# Patient Record
Sex: FEMALE | Race: WHITE | NOT HISPANIC OR LATINO | Employment: FULL TIME | URBAN - METROPOLITAN AREA
[De-identification: names, ages, dates, MRNs, and addresses within clinical notes are randomized per-mention and may not be internally consistent; named-entity substitution may affect disease eponyms.]

---

## 2017-03-08 ENCOUNTER — HOSPITAL ENCOUNTER (OUTPATIENT)
Dept: LAB | Facility: MEDICAL CENTER | Age: 37
End: 2017-03-08
Payer: COMMERCIAL

## 2017-03-08 LAB
25(OH)D3 SERPL-MCNC: 29 NG/ML (ref 30–100)
EST. AVERAGE GLUCOSE BLD GHB EST-MCNC: 103 MG/DL
GLUCOSE SERPL-MCNC: 91 MG/DL (ref 65–99)
HBA1C MFR BLD: 5.2 % (ref 0–5.6)
T3FREE SERPL-MCNC: 4.17 PG/ML (ref 2.4–4.2)
T4 FREE SERPL-MCNC: 0.87 NG/DL (ref 0.53–1.43)
TSH SERPL DL<=0.005 MIU/L-ACNC: 0.99 UIU/ML (ref 0.3–3.7)
URATE SERPL-MCNC: 4 MG/DL (ref 1.9–8.2)

## 2017-03-08 PROCEDURE — 84481 FREE ASSAY (FT-3): CPT

## 2017-03-08 PROCEDURE — 84482 T3 REVERSE: CPT

## 2017-03-08 PROCEDURE — 84550 ASSAY OF BLOOD/URIC ACID: CPT

## 2017-03-08 PROCEDURE — 82306 VITAMIN D 25 HYDROXY: CPT

## 2017-03-08 PROCEDURE — 83525 ASSAY OF INSULIN: CPT

## 2017-03-08 PROCEDURE — 82947 ASSAY GLUCOSE BLOOD QUANT: CPT

## 2017-03-08 PROCEDURE — 84443 ASSAY THYROID STIM HORMONE: CPT

## 2017-03-08 PROCEDURE — 84439 ASSAY OF FREE THYROXINE: CPT

## 2017-03-08 PROCEDURE — 83036 HEMOGLOBIN GLYCOSYLATED A1C: CPT

## 2017-03-08 PROCEDURE — 36415 COLL VENOUS BLD VENIPUNCTURE: CPT

## 2017-03-10 LAB — INSULIN P FAST SERPL-ACNC: 10 UIU/ML (ref 3–19)

## 2017-03-11 LAB — T3REVERSE SERPL-MCNC: 10 NG/DL (ref 9–27)

## 2017-12-03 DIAGNOSIS — G43.101 MIGRAINE WITH AURA AND WITH STATUS MIGRAINOSUS, NOT INTRACTABLE: ICD-10-CM

## 2017-12-04 ENCOUNTER — TELEPHONE (OUTPATIENT)
Dept: NEUROLOGY | Facility: MEDICAL CENTER | Age: 37
End: 2017-12-04

## 2017-12-05 RX ORDER — RIZATRIPTAN BENZOATE 10 MG/1
TABLET, ORALLY DISINTEGRATING ORAL
Qty: 9 TAB | Refills: 2 | Status: SHIPPED | OUTPATIENT
Start: 2017-12-05 | End: 2019-02-06 | Stop reason: SDUPTHER

## 2019-02-06 DIAGNOSIS — G43.101 MIGRAINE WITH AURA AND WITH STATUS MIGRAINOSUS, NOT INTRACTABLE: ICD-10-CM

## 2019-02-06 RX ORDER — RIZATRIPTAN BENZOATE 10 MG/1
TABLET, ORALLY DISINTEGRATING ORAL
Qty: 9 TAB | Refills: 0 | Status: SHIPPED
Start: 2019-02-06 | End: 2020-05-08

## 2020-05-08 ENCOUNTER — OFFICE VISIT (OUTPATIENT)
Dept: URGENT CARE | Facility: CLINIC | Age: 40
End: 2020-05-08
Payer: COMMERCIAL

## 2020-05-08 VITALS
DIASTOLIC BLOOD PRESSURE: 96 MMHG | SYSTOLIC BLOOD PRESSURE: 128 MMHG | RESPIRATION RATE: 16 BRPM | HEART RATE: 96 BPM | TEMPERATURE: 98.7 F | WEIGHT: 165 LBS | OXYGEN SATURATION: 98 % | BODY MASS INDEX: 28.17 KG/M2 | HEIGHT: 64 IN

## 2020-05-08 DIAGNOSIS — I88.9 LYMPHADENITIS: ICD-10-CM

## 2020-05-08 PROCEDURE — 99214 OFFICE O/P EST MOD 30 MIN: CPT | Performed by: FAMILY MEDICINE

## 2020-05-08 RX ORDER — RIZATRIPTAN BENZOATE 10 MG/1
TABLET ORAL
COMMUNITY
Start: 2020-04-23 | End: 2020-11-18

## 2020-05-08 RX ORDER — CEPHALEXIN 500 MG/1
500 CAPSULE ORAL 3 TIMES DAILY
Qty: 21 CAP | Refills: 0 | Status: SHIPPED | OUTPATIENT
Start: 2020-05-08 | End: 2020-05-15

## 2020-05-08 NOTE — PROGRESS NOTES
"Subjective:      Kerry Champagne is a 40 y.o. female who presents with Bump (under chin x3 wks )      - This is a pleasant and non toxic appearing 40 y.o. female with c/o gland swelling x 3 wks Rt side neck. No trauma or NVFC. Nothing makes it better or worse.             ALLERGIES:  Lactose and Percocet [oxycodone-acetaminophen]     PMH:  Past Medical History:   Diagnosis Date   • Family planning, IUD (intrauterine device) in place 4/27/2015   • Migraine    • Migraine with aura and without status migrainosus, not intractable         PSH:  Past Surgical History:   Procedure Laterality Date   • HAMMERTOE CORRECTION Left 5/22/2015    Procedure: HAMMERTOE CORRECTION: WITH PERCUTANEOUS FIXATION 4TH;  Surgeon: John Lorenzo D.P.M.;  Location: SURGERY SAME DAY [facility];  Service:    • DILATION AND EVACUATION  11/2/2010    Performed by ANAHI RICHEY at SURGERY [facility]   • HAMMERTOE CORRECTION  2002   • OTHER ORTHOPEDIC SURGERY  1999    foot surgery       MEDS:    Current Outpatient Medications:   •  cephALEXin (KEFLEX) 500 MG Cap, Take 1 Cap by mouth 3 times a day for 7 days., Disp: 21 Cap, Rfl: 0  •  rizatriptan (MAXALT) 10 MG tablet, , Disp: , Rfl:     ** I have documented what I find to be significant in regards to past medical, social, family and surgical history  in my HPI or under PMH/PSH/FH review section, otherwise it is contributory **           HPI    Review of Systems   HENT:        Gland swelling     All other systems reviewed and are negative.         Objective:     /96   Pulse 96   Temp 37.1 °C (98.7 °F) (Temporal)   Resp 16   Ht 1.626 m (5' 4\")   Wt 74.8 kg (165 lb)   SpO2 98%   BMI 28.32 kg/m²      Physical Exam  Vitals signs and nursing note reviewed.   Constitutional:       General: She is not in acute distress.     Appearance: She is well-developed. She is not diaphoretic.   HENT:      Head: Normocephalic and atraumatic.      Mouth/Throat:      Mouth: Mucous membranes " are moist.      Pharynx: Oropharynx is clear. No oropharyngeal exudate or posterior oropharyngeal erythema.   Eyes:      Conjunctiva/sclera: Conjunctivae normal.   Neck:      Musculoskeletal: No neck rigidity or muscular tenderness.   Cardiovascular:      Heart sounds: Normal heart sounds. No murmur.   Pulmonary:      Effort: Pulmonary effort is normal. No respiratory distress.      Breath sounds: Normal breath sounds.   Lymphadenopathy:      Cervical: Cervical adenopathy ( olive sized soft rubbery mobile Rt upper anterior cervical node) present.   Skin:     General: Skin is warm and dry.   Neurological:      Mental Status: She is alert.      Motor: No abnormal muscle tone.   Psychiatric:         Judgment: Judgment normal.                 Assessment/Plan:           1. Lymphadenitis  cephALEXin (KEFLEX) 500 MG Cap       - rest  - E.R. precautions discussed     Dx & d/c instructions discussed w/ patient and/or family members.     Follow up with PCP (or UC if PCP is unavailable) in 2-3 days to make sure improving and no further additional treatment needed, ER if not improving or feeling/getting worse.    Any realistic and/or common medication side effects that may have been given today(i.e. Rash, GI upset/constipation, sedation, elevation of BP or blood sugars) reviewed.     Patient left in stable condition      reviewed if narcotics given

## 2020-06-16 ENCOUNTER — HOSPITAL ENCOUNTER (OUTPATIENT)
Dept: RADIOLOGY | Facility: MEDICAL CENTER | Age: 40
End: 2020-06-16
Attending: OTOLARYNGOLOGY
Payer: COMMERCIAL

## 2020-06-16 DIAGNOSIS — R22.1 NECK MASS: ICD-10-CM

## 2020-06-16 PROCEDURE — 76536 US EXAM OF HEAD AND NECK: CPT

## 2020-06-23 ENCOUNTER — HOSPITAL ENCOUNTER (OUTPATIENT)
Dept: RADIOLOGY | Facility: MEDICAL CENTER | Age: 40
End: 2020-06-23
Attending: OTOLARYNGOLOGY
Payer: COMMERCIAL

## 2020-06-23 DIAGNOSIS — R22.1 NECK MASS: ICD-10-CM

## 2020-06-23 PROCEDURE — 10005 FNA BX W/US GDN 1ST LES: CPT

## 2020-06-23 PROCEDURE — 88112 CYTOPATH CELL ENHANCE TECH: CPT

## 2020-06-23 PROCEDURE — 88305 TISSUE EXAM BY PATHOLOGIST: CPT

## 2020-06-23 NOTE — PROGRESS NOTES
MU RN note.     US guided right Neck mass nodule fine needle aspiration done by Dr. Aaron; NON-SEDATION  (no H&P required as this is a NON SEDATION procedure) Right  anterior aspect of neck access site; 1 jar of cytolyt and x1 RPMI obtained and sent to pathology lab; pt tolerated the procedure well;  all questions and concerns answered prior to being d/c; patient provided with appropriate education for procedure; pt d/c home.

## 2020-06-24 LAB — CYTOLOGY REG CYTOL: NORMAL

## 2020-10-28 ENCOUNTER — HOSPITAL ENCOUNTER (OUTPATIENT)
Dept: RADIOLOGY | Facility: MEDICAL CENTER | Age: 40
End: 2020-10-28
Attending: OTOLARYNGOLOGY
Payer: COMMERCIAL

## 2020-10-28 DIAGNOSIS — R22.1 NECK MASS: ICD-10-CM

## 2020-10-28 PROCEDURE — 700117 HCHG RX CONTRAST REV CODE 255: Performed by: INTERNAL MEDICINE

## 2020-10-28 PROCEDURE — 70491 CT SOFT TISSUE NECK W/DYE: CPT

## 2020-10-28 RX ADMIN — IOHEXOL 80 ML: 350 INJECTION, SOLUTION INTRAVENOUS at 15:00

## 2020-11-18 ENCOUNTER — OFFICE VISIT (OUTPATIENT)
Dept: MEDICAL GROUP | Facility: MEDICAL CENTER | Age: 40
End: 2020-11-18
Payer: COMMERCIAL

## 2020-11-18 VITALS
RESPIRATION RATE: 16 BRPM | HEIGHT: 64 IN | TEMPERATURE: 97.6 F | OXYGEN SATURATION: 96 % | SYSTOLIC BLOOD PRESSURE: 131 MMHG | HEART RATE: 86 BPM | BODY MASS INDEX: 31.62 KG/M2 | WEIGHT: 185.19 LBS | DIASTOLIC BLOOD PRESSURE: 74 MMHG

## 2020-11-18 DIAGNOSIS — Z23 NEED FOR VACCINATION: ICD-10-CM

## 2020-11-18 DIAGNOSIS — Z83.49 FAMILY HISTORY OF THYROID DISEASE: ICD-10-CM

## 2020-11-18 DIAGNOSIS — E78.00 ELEVATED LDL CHOLESTEROL LEVEL: ICD-10-CM

## 2020-11-18 DIAGNOSIS — G43.109 MIGRAINE WITH AURA AND WITHOUT STATUS MIGRAINOSUS, NOT INTRACTABLE: ICD-10-CM

## 2020-11-18 DIAGNOSIS — E55.9 VITAMIN D INSUFFICIENCY: ICD-10-CM

## 2020-11-18 DIAGNOSIS — Z00.00 ANNUAL PHYSICAL EXAM: ICD-10-CM

## 2020-11-18 DIAGNOSIS — Z13.1 SCREENING FOR DIABETES MELLITUS: ICD-10-CM

## 2020-11-18 PROCEDURE — 99395 PREV VISIT EST AGE 18-39: CPT | Mod: 25 | Performed by: FAMILY MEDICINE

## 2020-11-18 PROCEDURE — 90714 TD VACC NO PRESV 7 YRS+ IM: CPT | Performed by: FAMILY MEDICINE

## 2020-11-18 PROCEDURE — 90471 IMMUNIZATION ADMIN: CPT | Performed by: FAMILY MEDICINE

## 2020-11-18 PROCEDURE — 90472 IMMUNIZATION ADMIN EACH ADD: CPT | Performed by: FAMILY MEDICINE

## 2020-11-18 PROCEDURE — 90686 IIV4 VACC NO PRSV 0.5 ML IM: CPT | Performed by: FAMILY MEDICINE

## 2020-11-18 RX ORDER — RIZATRIPTAN BENZOATE 10 MG/1
10 TABLET, ORALLY DISINTEGRATING ORAL
Qty: 30 TAB | Refills: 0 | Status: SHIPPED | OUTPATIENT
Start: 2020-11-18 | End: 2023-04-18

## 2020-11-18 ASSESSMENT — ENCOUNTER SYMPTOMS
PALPITATIONS: 0
ABDOMINAL PAIN: 0
FOCAL WEAKNESS: 0
NAUSEA: 0
FEVER: 0
DIZZINESS: 0
NERVOUS/ANXIOUS: 0
HEMOPTYSIS: 0
VOMITING: 0
SPUTUM PRODUCTION: 0
SHORTNESS OF BREATH: 0
DIARRHEA: 0
EYE PAIN: 0
WEIGHT LOSS: 0
DEPRESSION: 0
SENSORY CHANGE: 0
CHILLS: 0
EYE REDNESS: 0
COUGH: 0
CONSTIPATION: 0
HEADACHES: 0
MYALGIAS: 0
SINUS PAIN: 0
WHEEZING: 0
EYE DISCHARGE: 0

## 2020-11-18 ASSESSMENT — LIFESTYLE VARIABLES: SUBSTANCE_ABUSE: 0

## 2020-11-18 ASSESSMENT — PATIENT HEALTH QUESTIONNAIRE - PHQ9: CLINICAL INTERPRETATION OF PHQ2 SCORE: 0

## 2020-11-18 NOTE — PROGRESS NOTES
cc: well exam    Subjective:     Kerry Champagne is a 40 y.o. female presents for a routine preventive health exam.      Patient has history of migraine headaches, reports that are controlled with rizatriptan as needed.  She reports that she gets migraine headaches once in 3 to 4 months.  She reports that she gets nausea, photophobia, phonophobia and aura.  Quested refill for medication.    Ob-Gyn/ History:    /Para:    Last Pap Smear:  Oct, 2020,no history of abnormal pap smears.  Gyn Surgery:  No  Current Contraceptive Method:   had vasectomy. currently sexually active.  Last menstrual period:  2020 Periods regular    Health Maintenance  Advanced directive:n/a   Osteoporosis Screen/ DEXA: n/a   PT/vit D for falls prevention: n/a   Cholesterol Screening: Ordered lipid panel.  Diabetes Screening: Ordered A1C  AAA Screening: n/a  Aspirin Use: n/a  Diet: Reports tries to eat healthy diet  Exercise: Is physically active  Substance Abuse: Denies  Safe in relationship.  Seat belts, bike helmet, gun safety discussed.  Sun protection used.    Cancer screening  Colorectal Cancer Screening:  Denies family history of colon cancer  Lung Cancer Screening: Denies smoking  Cervical Cancer Screening: Reports her Pap smear 2020 was normal.  Breast Cancer Screening: Denies family history of breast cancer.    Infectious disease screening/Immunizations  --STI Screening: Declined  --Practices safe sex.  --Immunizations:        Review of Systems   Constitutional: Negative for chills, fever, malaise/fatigue and weight loss.   HENT: Negative for ear discharge, ear pain, hearing loss and sinus pain.    Eyes: Negative for pain, discharge and redness.   Respiratory: Negative for cough, hemoptysis, sputum production, shortness of breath and wheezing.    Cardiovascular: Negative for chest pain, palpitations and leg swelling.   Gastrointestinal: Negative for abdominal pain, constipation, diarrhea,  nausea and vomiting.   Genitourinary: Negative for dysuria, frequency and urgency.   Musculoskeletal: Negative for joint pain and myalgias.   Skin: Negative for itching and rash.   Neurological: Negative for dizziness, sensory change, focal weakness and headaches.   Endo/Heme/Allergies: Negative for environmental allergies.   Psychiatric/Behavioral: Negative for depression, substance abuse and suicidal ideas. The patient is not nervous/anxious.              Current Outpatient Medications:   •  rizatriptan (MAXALT-MLT) 10 MG disintegrating tablet, Take 1 Tab by mouth 1 time daily as needed for Migraine., Disp: 30 Tab, Rfl: 0  •  Multiple Vitamins-Minerals (MULTIVITAMIN ADULT PO), Take  by mouth., Disp: , Rfl:     Allergies   Allergen Reactions   • Lactose      Dairy   • Percocet [Oxycodone-Acetaminophen] Vomiting and Nausea     'dizzy'       Past Medical History:   Diagnosis Date   • Family planning, IUD (intrauterine device) in place 4/27/2015   • Migraine    • Migraine with aura and without status migrainosus, not intractable      Past Surgical History:   Procedure Laterality Date   • HAMMERTOE CORRECTION Left 5/22/2015    Procedure: HAMMERTOE CORRECTION: WITH PERCUTANEOUS FIXATION 4TH;  Surgeon: ELLA DempseyPGABRIELLA;  Location: SURGERY SAME DAY [facility];  Service:    • DILATION AND EVACUATION  11/2/2010    Performed by ANAHI RICHEY at SURGERY [facility]   • HAMMERTOE CORRECTION  2002   • OTHER ORTHOPEDIC SURGERY  1999    foot surgery     Family History   Problem Relation Age of Onset   • Hypertension Mother    • Hypertension Maternal Grandmother    • Stroke Maternal Grandfather      Social History     Tobacco Use   • Smoking status: Never Smoker   • Smokeless tobacco: Never Used   Substance Use Topics   • Alcohol use: Yes     Alcohol/week: 0.0 oz     Comment: Rare; 1/week   • Drug use: No        Objective:     Vitals: /74 (BP Location: Left arm, Patient Position: Sitting, BP Cuff Size: Adult)    "Pulse 86   Temp 36.4 °C (97.6 °F)   Resp 16   Ht 1.626 m (5' 4\")   Wt 84 kg (185 lb 3 oz)   SpO2 96%   BMI 31.79 kg/m²     Physical Exam   Constitutional: She is well-developed, well-nourished, and in no distress. No distress.   HENT:   Head: Normocephalic and atraumatic.   Right Ear: External ear normal.   Left Ear: External ear normal.   Eyes: Conjunctivae and EOM are normal. Right eye exhibits no discharge. Left eye exhibits no discharge.   Neck: Neck supple. No thyromegaly present.   Cardiovascular: Normal rate, regular rhythm, normal heart sounds and intact distal pulses.   No murmur heard.  Pulmonary/Chest: Effort normal and breath sounds normal. No respiratory distress. She has no wheezes. She has no rales.   Abdominal: Soft. Bowel sounds are normal. She exhibits no distension. There is no abdominal tenderness. There is no guarding.   Musculoskeletal: Normal range of motion.         General: No deformity or edema.   Neurological: She is alert. She exhibits normal muscle tone. Gait normal.   Skin: Skin is warm. No rash noted. She is not diaphoretic.   Psychiatric: Mood, affect and judgment normal.           Assessment/Plan:     Diagnoses and all orders for this visit:    Annual physical exam  · Advised healthy diet and aerobic exercise.    Migraine with aura and without status migrainosus, not intractable  · Controlled, refilled medication.    -     rizatriptan (MAXALT-MLT) 10 MG disintegrating tablet; Take 1 Tab by mouth 1 time daily as needed for Migraine.    Elevated LDL cholesterol level  · Uncontrolled LDL level, advised healthy diet and aerobic exercise.  · Recheck labs.    -     Comp Metabolic Panel; Future  -     HEMOGLOBIN A1C; Future  -     Lipid Profile; Future  -     TSH WITH REFLEX TO FT4; Future  -     Patient identified as having weight management issue.  Appropriate orders and counseling given.    Screening for diabetes mellitus  -     Comp Metabolic Panel; Future  -     HEMOGLOBIN A1C; " Future    Family history of thyroid disease  -     Comp Metabolic Panel; Future  -     TSH WITH REFLEX TO FT4; Future    Vitamin D insufficiency  -     VITAMIN D,25 HYDROXY; Future    Need for vaccination  -     Influenza Vaccine Quad Injection (PF)  -     TD Preservative Free =>6yo IM        Preventive visit in 1 year, sooner as needed for any concerns.

## 2020-11-18 NOTE — LETTER
[facility] hospital  Darryl Padilla M.D.  [address]  [place] [address]  Fax: [phone]   Authorization for Release/Disclosure of   Protected Health Information   Name: KERRY CHAMPAGNE : 1980 SSN: [id number]   Address: [facility] [address] [facility] [address]  [place] [address] Phone:    [phone] (home)    I authorize the entity listed below to release/disclose the PHI below to:   [facility] hospital/Darryl Padilla M.D. and Darryl Padilla M.D.   Provider or Entity Name   Address   City, State, Zip   Phone:      Fax:     Reason for request: continuity of care   Information to be released:    [  ] LAST COLONOSCOPY,  including any PATH REPORT and follow-up  [  ] LAST FIT/COLOGUARD RESULT [  ] LAST DEXA  [  ] LAST MAMMOGRAM  [  ] LAST PAP  [  ] LAST LABS [  ] RETINA EXAM REPORT  [  ] IMMUNIZATION RECORDS  [  ] Release all info      [  ] Check here and initial the line next to each item to release ALL health information INCLUDING  _____ Care and treatment for drug and / or alcohol abuse  _____ HIV testing, infection status, or AIDS  _____ Genetic Testing    DATES OF SERVICE OR TIME PERIOD TO BE DISCLOSED: _____________  I understand and acknowledge that:  * This Authorization may be revoked at any time by you in writing, except if your health information has already been used or disclosed.  * Your health information that will be used or disclosed as a result of you signing this authorization could be re-disclosed by the recipient. If this occurs, your re-disclosed health information may no longer be protected by State or Federal laws.  * You may refuse to sign this Authorization. Your refusal will not affect your ability to obtain treatment.  * This Authorization becomes effective upon signing and will  on (date) __________.      If no date is indicated, this Authorization will  one (1) year from the signature date.    Name: Kerry Champagne    Signature:   Date:          11/18/2020       PLEASE FAX REQUESTED RECORDS BACK TO: [phone]

## 2020-11-30 ENCOUNTER — HOSPITAL ENCOUNTER (OUTPATIENT)
Dept: LAB | Facility: MEDICAL CENTER | Age: 40
End: 2020-11-30
Attending: FAMILY MEDICINE
Payer: COMMERCIAL

## 2020-11-30 ENCOUNTER — HOSPITAL ENCOUNTER (OUTPATIENT)
Dept: LAB | Facility: MEDICAL CENTER | Age: 40
End: 2020-11-30
Attending: OBSTETRICS & GYNECOLOGY
Payer: COMMERCIAL

## 2020-11-30 DIAGNOSIS — Z13.1 SCREENING FOR DIABETES MELLITUS: ICD-10-CM

## 2020-11-30 DIAGNOSIS — E78.00 ELEVATED LDL CHOLESTEROL LEVEL: ICD-10-CM

## 2020-11-30 DIAGNOSIS — Z83.49 FAMILY HISTORY OF THYROID DISEASE: ICD-10-CM

## 2020-11-30 DIAGNOSIS — E55.9 VITAMIN D INSUFFICIENCY: ICD-10-CM

## 2020-11-30 LAB
25(OH)D3 SERPL-MCNC: 39 NG/ML (ref 30–100)
25(OH)D3 SERPL-MCNC: 40 NG/ML (ref 30–100)
ALBUMIN SERPL BCP-MCNC: 4.2 G/DL (ref 3.2–4.9)
ALBUMIN/GLOB SERPL: 1.6 G/DL
ALP SERPL-CCNC: 74 U/L (ref 30–99)
ALT SERPL-CCNC: 12 U/L (ref 2–50)
ANION GAP SERPL CALC-SCNC: 8 MMOL/L (ref 7–16)
AST SERPL-CCNC: 13 U/L (ref 12–45)
BILIRUB SERPL-MCNC: 0.4 MG/DL (ref 0.1–1.5)
BUN SERPL-MCNC: 9 MG/DL (ref 8–22)
CALCIUM SERPL-MCNC: 8.9 MG/DL (ref 8.5–10.5)
CHLORIDE SERPL-SCNC: 102 MMOL/L (ref 96–112)
CHOLEST SERPL-MCNC: 240 MG/DL (ref 100–199)
CO2 SERPL-SCNC: 25 MMOL/L (ref 20–33)
CREAT SERPL-MCNC: 0.83 MG/DL (ref 0.5–1.4)
FASTING STATUS PATIENT QL REPORTED: NORMAL
GLOBULIN SER CALC-MCNC: 2.6 G/DL (ref 1.9–3.5)
GLUCOSE SERPL-MCNC: 94 MG/DL (ref 65–99)
HDLC SERPL-MCNC: 57 MG/DL
LDLC SERPL CALC-MCNC: 159 MG/DL
POTASSIUM SERPL-SCNC: 4.2 MMOL/L (ref 3.6–5.5)
PROT SERPL-MCNC: 6.8 G/DL (ref 6–8.2)
SODIUM SERPL-SCNC: 135 MMOL/L (ref 135–145)
TRIGL SERPL-MCNC: 118 MG/DL (ref 0–149)
TSH SERPL DL<=0.005 MIU/L-ACNC: 0.7 UIU/ML (ref 0.38–5.33)
TSH SERPL DL<=0.005 MIU/L-ACNC: 0.71 UIU/ML (ref 0.38–5.33)

## 2020-11-30 PROCEDURE — 82306 VITAMIN D 25 HYDROXY: CPT

## 2020-11-30 PROCEDURE — 80053 COMPREHEN METABOLIC PANEL: CPT

## 2020-11-30 PROCEDURE — 84443 ASSAY THYROID STIM HORMONE: CPT | Mod: 91

## 2020-11-30 PROCEDURE — 82306 VITAMIN D 25 HYDROXY: CPT | Mod: 91

## 2020-11-30 PROCEDURE — 84443 ASSAY THYROID STIM HORMONE: CPT

## 2020-11-30 PROCEDURE — 83036 HEMOGLOBIN GLYCOSYLATED A1C: CPT

## 2020-11-30 PROCEDURE — 36415 COLL VENOUS BLD VENIPUNCTURE: CPT

## 2020-11-30 PROCEDURE — 80061 LIPID PANEL: CPT

## 2020-12-01 DIAGNOSIS — E78.2 MIXED HYPERLIPIDEMIA: ICD-10-CM

## 2020-12-01 LAB
EST. AVERAGE GLUCOSE BLD GHB EST-MCNC: 105 MG/DL
HBA1C MFR BLD: 5.3 % (ref 0–5.6)

## 2021-04-22 ENCOUNTER — APPOINTMENT (OUTPATIENT)
Dept: RADIOLOGY | Facility: MEDICAL CENTER | Age: 41
End: 2021-04-22
Attending: FAMILY MEDICINE
Payer: COMMERCIAL

## 2021-04-22 DIAGNOSIS — Z12.31 VISIT FOR SCREENING MAMMOGRAM: ICD-10-CM

## 2021-06-10 ENCOUNTER — HOSPITAL ENCOUNTER (OUTPATIENT)
Dept: RADIOLOGY | Facility: MEDICAL CENTER | Age: 41
End: 2021-06-10
Attending: FAMILY MEDICINE
Payer: COMMERCIAL

## 2021-06-10 DIAGNOSIS — Z12.31 VISIT FOR SCREENING MAMMOGRAM: ICD-10-CM

## 2021-06-10 PROCEDURE — 77063 BREAST TOMOSYNTHESIS BI: CPT

## 2021-09-17 ENCOUNTER — TELEMEDICINE (OUTPATIENT)
Dept: MEDICAL GROUP | Facility: MEDICAL CENTER | Age: 41
End: 2021-09-17
Payer: COMMERCIAL

## 2021-09-17 VITALS — BODY MASS INDEX: 30.73 KG/M2 | WEIGHT: 180 LBS | HEIGHT: 64 IN

## 2021-09-17 DIAGNOSIS — Z13.1 SCREENING FOR DIABETES MELLITUS: ICD-10-CM

## 2021-09-17 DIAGNOSIS — E55.9 VITAMIN D INSUFFICIENCY: ICD-10-CM

## 2021-09-17 DIAGNOSIS — E78.2 MIXED HYPERLIPIDEMIA: ICD-10-CM

## 2021-09-17 DIAGNOSIS — Z13.29 SCREENING FOR THYROID DISORDER: ICD-10-CM

## 2021-09-17 DIAGNOSIS — T75.3XXA SEA SICKNESS, INITIAL ENCOUNTER: ICD-10-CM

## 2021-09-17 PROCEDURE — 99214 OFFICE O/P EST MOD 30 MIN: CPT | Mod: 95 | Performed by: FAMILY MEDICINE

## 2021-09-17 RX ORDER — SCOLOPAMINE TRANSDERMAL SYSTEM 1 MG/1
1 PATCH, EXTENDED RELEASE TRANSDERMAL
Qty: 4 PATCH | Refills: 2 | Status: SHIPPED | OUTPATIENT
Start: 2021-09-17 | End: 2023-04-18

## 2021-09-17 ASSESSMENT — PATIENT HEALTH QUESTIONNAIRE - PHQ9: CLINICAL INTERPRETATION OF PHQ2 SCORE: 0

## 2021-09-17 NOTE — PROGRESS NOTES
"Virtual Visit: Established Patient   This visit was conducted via Zoom using secure and encrypted videoconferencing technology.   The patient was in a private location in the [facility].    The patient's identity was confirmed and verbal consent was obtained for this virtual visit.    Subjective:   CC:   Chief Complaint   Patient presents with   • Medication Refill   • Requesting Labs       Kerry Champagne is a 41 y.o. female presenting for scopolamine patch prescription and labs.    Her previous labs showed elevated cholesterol levels.  We will recheck her levels.    She has previous history of vitamin D deficiency, previous levels came back in normal range.  She is probably history of thyroid disease, will check her thyroid function test.      She is traveling to [place] and she gets seasickness and requested scopolamine patch.      ROS   No fever, chills, shortness of breath    Current medicines (including changes today)  Current Outpatient Medications   Medication Sig Dispense Refill   • scopolamine (TRANSDERM-SCOP, 1.5 MG,) 1 mg/72hr PATCH 72 HR Place 1 Patch on the skin every 72 hours. 4 Patch 2   • rizatriptan (MAXALT-MLT) 10 MG disintegrating tablet Take 1 Tab by mouth 1 time daily as needed for Migraine. 30 Tab 0   • Multiple Vitamins-Minerals (MULTIVITAMIN ADULT PO) Take  by mouth.       No current facility-administered medications for this visit.       Patient Active Problem List    Diagnosis Date Noted   • Mixed hyperlipidemia 11/18/2020   • Family history of thyroid disease 11/18/2020   • Vitamin D insufficiency 11/18/2020   • Migraine with aura and without status migrainosus, not intractable 05/27/2016   • Other hammer toe (acquired) 05/22/2015        Objective:   Ht 1.626 m (5' 4\")   Wt 81.6 kg (180 lb)   BMI 30.90 kg/m²     Physical Exam:  Constitutional: Alert, no distress, well-groomed.  Skin: No rashes in visible areas.  Eye: Round. Conjunctiva clear, lids normal. No icterus. "   ENMT: Lips pink without lesions, good dentition, moist mucous membranes. Phonation normal.  Neck: No masses, no thyromegaly. Moves freely without pain.  Respiratory: Unlabored respiratory effort, no cough or audible wheeze  Psych: Alert and oriented x3, normal affect and mood.     Assessment and Plan:   The following treatment plan was discussed:     1. Vitamin D insufficiency  Chronic health problem, last vitamin D level in November 2020 came back at 39.  Recheck vitamin D level.  - VITAMIN D,25 HYDROXY; Future    2. Mixed hyperlipidemia  Chronic health problem, last lipid panel in November 2020 showed total cholesterol at 240, LDL at 159.  Recheck lipid panel.    - Comp Metabolic Panel; Future  - Lipid Profile; Future    3. Screening for diabetes mellitus  - HEMOGLOBIN A1C; Future    4. Screening for thyroid disorder  - TSH WITH REFLEX TO FT4; Future    5. Sea sickness, initial encounter  She gets seasickness.  Scopolamine patch prescribed.    - scopolamine (TRANSDERM-SCOP, 1.5 MG,) 1 mg/72hr PATCH 72 HR; Place 1 Patch on the skin every 72 hours.  Dispense: 4 Patch; Refill: 2    She did Pap smear with OB/GYN last year, will request records at her next visit.    Follow-up: Return in about 2 months (around 11/17/2021) for annual physical exam..

## 2021-11-17 ENCOUNTER — OFFICE VISIT (OUTPATIENT)
Dept: MEDICAL GROUP | Facility: MEDICAL CENTER | Age: 41
End: 2021-11-17
Payer: COMMERCIAL

## 2021-11-17 VITALS
SYSTOLIC BLOOD PRESSURE: 130 MMHG | BODY MASS INDEX: 32.47 KG/M2 | TEMPERATURE: 99.2 F | RESPIRATION RATE: 16 BRPM | HEIGHT: 64 IN | WEIGHT: 190.2 LBS | OXYGEN SATURATION: 98 % | HEART RATE: 74 BPM | DIASTOLIC BLOOD PRESSURE: 80 MMHG

## 2021-11-17 DIAGNOSIS — Z23 NEED FOR VACCINATION: ICD-10-CM

## 2021-11-17 DIAGNOSIS — Z00.00 ANNUAL PHYSICAL EXAM: ICD-10-CM

## 2021-11-17 PROCEDURE — 90471 IMMUNIZATION ADMIN: CPT | Performed by: FAMILY MEDICINE

## 2021-11-17 PROCEDURE — 99396 PREV VISIT EST AGE 40-64: CPT | Mod: 25 | Performed by: FAMILY MEDICINE

## 2021-11-17 PROCEDURE — 90686 IIV4 VACC NO PRSV 0.5 ML IM: CPT | Performed by: FAMILY MEDICINE

## 2021-11-17 ASSESSMENT — ENCOUNTER SYMPTOMS
FEVER: 0
WHEEZING: 0
MYALGIAS: 0
FOCAL WEAKNESS: 0
WEIGHT LOSS: 0
DIZZINESS: 0
VOMITING: 0
SPUTUM PRODUCTION: 0
PALPITATIONS: 0
HEADACHES: 0
NERVOUS/ANXIOUS: 0
COUGH: 0
EYE DISCHARGE: 0
SINUS PAIN: 0
HEMOPTYSIS: 0
EYE REDNESS: 0
CONSTIPATION: 0
SHORTNESS OF BREATH: 0
EYE PAIN: 0
DIARRHEA: 0
NAUSEA: 0
CHILLS: 0
SENSORY CHANGE: 0
DEPRESSION: 0
ABDOMINAL PAIN: 0

## 2021-11-17 NOTE — PROGRESS NOTES
cc: well exam    Subjective:     Kerry Champagne is a 41 y.o. female presents for a routine preventive health exam.    Ob-Gyn/ History:    /Para:    Last Pap Smear:  , no history of abnormal pap smears.  Gyn Surgery:  None  Current Contraceptive Method:  None, currently sexually active.  Last menstrual period:  2 weeks ago.  Periods regular    Health Maintenance  Advanced directive: n/a   Osteoporosis Screen/ DEXA: n/a   PT/vit D for falls prevention: n/a   Cholesterol Screening: Ordered lipid panel  Diabetes Screening: Ordered CMP for fasting glucose  AAA Screening: n/a   Aspirin Use: n/a    Diet: Eats healthy diet  Exercise: She does walks and [employer] bike.  Substance Abuse: None  Safe in relationship.   Seat belts, bike helmet, gun safety discussed.  Sun protection used.    Cancer screening  Colorectal Cancer Screening: No family history of colon cancer, no symptoms  Lung Cancer Screening: She does not smoke  Cervical Cancer Screening: Pap smear done last year, request records  Breast Cancer Screening: Recent mammogram negative    Infectious disease screening/Immunizations  --STI Screening: None  --Practices safe sex.  --Immunizations: Up-to-date, flu vaccine given today           Review of Systems   Constitutional: Negative for chills, fever, malaise/fatigue and weight loss.   HENT: Negative for ear discharge, ear pain, hearing loss and sinus pain.    Eyes: Negative for pain, discharge and redness.   Respiratory: Negative for cough, hemoptysis, sputum production, shortness of breath and wheezing.    Cardiovascular: Negative for chest pain, palpitations and leg swelling.   Gastrointestinal: Negative for abdominal pain, constipation, diarrhea, nausea and vomiting.   Genitourinary: Negative for dysuria, frequency and urgency.   Musculoskeletal: Negative for joint pain and myalgias.   Skin: Negative for itching and rash.   Neurological: Negative for dizziness, sensory change, focal weakness  "and headaches.   Endo/Heme/Allergies: Negative for environmental allergies.   Psychiatric/Behavioral: Negative for depression. The patient is not nervous/anxious.           Current Outpatient Medications:   •  scopolamine (TRANSDERM-SCOP, 1.5 MG,) 1 mg/72hr PATCH 72 HR, Place 1 Patch on the skin every 72 hours., Disp: 4 Patch, Rfl: 2  •  rizatriptan (MAXALT-MLT) 10 MG disintegrating tablet, Take 1 Tab by mouth 1 time daily as needed for Migraine., Disp: 30 Tab, Rfl: 0  •  Multiple Vitamins-Minerals (MULTIVITAMIN ADULT PO), Take  by mouth., Disp: , Rfl:     Allergies   Allergen Reactions   • Lactose      Dairy   • Percocet [Oxycodone-Acetaminophen] Vomiting and Nausea     'dizzy'          Objective:     /80   Pulse 74   Temp 37.3 °C (99.2 °F) (Temporal)   Resp 16   Ht 1.626 m (5' 4\")   Wt 86.3 kg (190 lb 3.2 oz)   SpO2 98%      Physical Exam  Constitutional:       General: She is not in acute distress.     Appearance: She is not diaphoretic.   HENT:      Head: Normocephalic and atraumatic.      Right Ear: External ear normal.      Left Ear: External ear normal.   Eyes:      General:         Right eye: No discharge.         Left eye: No discharge.      Conjunctiva/sclera: Conjunctivae normal.   Neck:      Thyroid: No thyromegaly.   Cardiovascular:      Rate and Rhythm: Normal rate and regular rhythm.      Heart sounds: Normal heart sounds. No murmur heard.      Pulmonary:      Effort: Pulmonary effort is normal. No respiratory distress.      Breath sounds: Normal breath sounds. No wheezing or rales.   Abdominal:      General: Bowel sounds are normal. There is no distension.      Palpations: Abdomen is soft.      Tenderness: There is no abdominal tenderness. There is no guarding.   Musculoskeletal:         General: No deformity. Normal range of motion.      Cervical back: Neck supple.   Skin:     General: Skin is warm.      Findings: No rash.   Neurological:      Mental Status: She is alert.      Gait: Gait " is intact.   Psychiatric:         Mood and Affect: Mood and affect normal.         Judgment: Judgment normal.            Assessment/Plan:     Kerry was seen today for annual wellness visit.    Diagnoses and all orders for this visit:    Annual physical exam  Up-to-date for cancer screening and vaccinations.  Recommended to continue eating healthy diet and aerobic exercise.  Recommended to do labs ordered previously.    Need for vaccination  -     Influenza Vaccine Quad Injection (PF)            Preventive visit in 1 year, sooner as needed for any concerns.

## 2021-11-17 NOTE — LETTER
[facility]  Darryl Padilla M.D.  [address]  [place] [address]  Fax: [phone]   Authorization for Release/Disclosure of   Protected Health Information   Name: KERRY KRUSE : 1980 SSN: [id number]   Address: [facility] [address] [facility] [address]  [place] [address] Phone:    [phone] (home)    I authorize the entity listed below to release/disclose the PHI below to:   [facility]/Darryl Padilla M.D. and Darryl Padilla M.D.   Provider or Entity Name:  [address], [place], [place]   Address   City, State, [facility]   Phone:      Fax:     Reason for request: continuity of care   Information to be released:    [  ] LAST COLONOSCOPY,  including any PATH REPORT and follow-up  [  ] LAST FIT/COLOGUARD RESULT [  ] LAST DEXA  [  ] LAST MAMMOGRAM  [ xx  ] LAST PAP  [  ] LAST LABS [  ] RETINA EXAM REPORT  [  ] IMMUNIZATION RECORDS  [  ] Release all info      [  ] Check here and initial the line next to each item to release ALL health information INCLUDING  _____ Care and treatment for drug and / or alcohol abuse  _____ HIV testing, infection status, or AIDS  _____ Genetic Testing    DATES OF SERVICE OR TIME PERIOD TO BE DISCLOSED: _____________  I understand and acknowledge that:  * This Authorization may be revoked at any time by you in writing, except if your health information has already been used or disclosed.  * Your health information that will be used or disclosed as a result of you signing this authorization could be re-disclosed by the recipient. If this occurs, your re-disclosed health information may no longer be protected by State or Federal laws.  * You may refuse to sign this Authorization. Your refusal will not affect your ability to obtain treatment.  * This Authorization becomes effective upon signing and will  on (date) __________.      If no date is indicated, this Authorization will  one (1) year from the signature date.    Name: Kerry  Yomaira Champagne    Signature:   Date:     11/17/2021       PLEASE FAX REQUESTED RECORDS BACK TO: [phone]

## 2023-04-18 ENCOUNTER — APPOINTMENT (OUTPATIENT)
Dept: RADIOLOGY | Facility: MEDICAL CENTER | Age: 43
End: 2023-04-18
Attending: EMERGENCY MEDICINE
Payer: COMMERCIAL

## 2023-04-18 ENCOUNTER — HOSPITAL ENCOUNTER (EMERGENCY)
Facility: MEDICAL CENTER | Age: 43
End: 2023-04-18
Attending: EMERGENCY MEDICINE
Payer: COMMERCIAL

## 2023-04-18 VITALS
BODY MASS INDEX: 34.48 KG/M2 | WEIGHT: 201.94 LBS | DIASTOLIC BLOOD PRESSURE: 84 MMHG | OXYGEN SATURATION: 94 % | RESPIRATION RATE: 15 BRPM | HEIGHT: 64 IN | TEMPERATURE: 97.2 F | SYSTOLIC BLOOD PRESSURE: 139 MMHG | HEART RATE: 74 BPM

## 2023-04-18 DIAGNOSIS — F41.1 ANXIETY REACTION: ICD-10-CM

## 2023-04-18 DIAGNOSIS — G43.009 ATYPICAL MIGRAINE: ICD-10-CM

## 2023-04-18 DIAGNOSIS — R11.0 NAUSEA: ICD-10-CM

## 2023-04-18 LAB
ALBUMIN SERPL BCP-MCNC: 4.7 G/DL (ref 3.2–4.9)
ALBUMIN/GLOB SERPL: 1.6 G/DL
ALP SERPL-CCNC: 86 U/L (ref 30–99)
ALT SERPL-CCNC: 14 U/L (ref 2–50)
ANION GAP SERPL CALC-SCNC: 15 MMOL/L (ref 7–16)
APTT PPP: 25.3 SEC (ref 24.7–36)
AST SERPL-CCNC: 16 U/L (ref 12–45)
BASOPHILS # BLD AUTO: 0.7 % (ref 0–1.8)
BASOPHILS # BLD: 0.07 K/UL (ref 0–0.12)
BILIRUB SERPL-MCNC: 0.5 MG/DL (ref 0.1–1.5)
BUN SERPL-MCNC: 8 MG/DL (ref 8–22)
CALCIUM ALBUM COR SERPL-MCNC: 8.6 MG/DL (ref 8.5–10.5)
CALCIUM SERPL-MCNC: 9.2 MG/DL (ref 8.4–10.2)
CHLORIDE SERPL-SCNC: 103 MMOL/L (ref 96–112)
CO2 SERPL-SCNC: 21 MMOL/L (ref 20–33)
CREAT SERPL-MCNC: 0.89 MG/DL (ref 0.5–1.4)
EOSINOPHIL # BLD AUTO: 0.34 K/UL (ref 0–0.51)
EOSINOPHIL NFR BLD: 3.5 % (ref 0–6.9)
ERYTHROCYTE [DISTWIDTH] IN BLOOD BY AUTOMATED COUNT: 41.9 FL (ref 35.9–50)
GFR SERPLBLD CREATININE-BSD FMLA CKD-EPI: 83 ML/MIN/1.73 M 2
GLOBULIN SER CALC-MCNC: 2.9 G/DL (ref 1.9–3.5)
GLUCOSE SERPL-MCNC: 115 MG/DL (ref 65–99)
HCT VFR BLD AUTO: 45 % (ref 37–47)
HGB BLD-MCNC: 15.1 G/DL (ref 12–16)
IMM GRANULOCYTES # BLD AUTO: 0.03 K/UL (ref 0–0.11)
IMM GRANULOCYTES NFR BLD AUTO: 0.3 % (ref 0–0.9)
INR PPP: 0.98 (ref 0.87–1.13)
LYMPHOCYTES # BLD AUTO: 2.5 K/UL (ref 1–4.8)
LYMPHOCYTES NFR BLD: 25.5 % (ref 22–41)
MCH RBC QN AUTO: 30 PG (ref 27–33)
MCHC RBC AUTO-ENTMCNC: 33.6 G/DL (ref 33.6–35)
MCV RBC AUTO: 89.3 FL (ref 81.4–97.8)
MONOCYTES # BLD AUTO: 0.73 K/UL (ref 0–0.85)
MONOCYTES NFR BLD AUTO: 7.4 % (ref 0–13.4)
NEUTROPHILS # BLD AUTO: 6.13 K/UL (ref 2–7.15)
NEUTROPHILS NFR BLD: 62.6 % (ref 44–72)
NRBC # BLD AUTO: 0 K/UL
NRBC BLD-RTO: 0 /100 WBC
PLATELET # BLD AUTO: 230 K/UL (ref 164–446)
PMV BLD AUTO: 9.8 FL (ref 9–12.9)
POTASSIUM SERPL-SCNC: 3.6 MMOL/L (ref 3.6–5.5)
PROT SERPL-MCNC: 7.6 G/DL (ref 6–8.2)
PROTHROMBIN TIME: 12.9 SEC (ref 12–14.6)
RBC # BLD AUTO: 5.04 M/UL (ref 4.2–5.4)
SODIUM SERPL-SCNC: 139 MMOL/L (ref 135–145)
WBC # BLD AUTO: 9.8 K/UL (ref 4.8–10.8)

## 2023-04-18 PROCEDURE — 85610 PROTHROMBIN TIME: CPT

## 2023-04-18 PROCEDURE — 85025 COMPLETE CBC W/AUTO DIFF WBC: CPT

## 2023-04-18 PROCEDURE — 96374 THER/PROPH/DIAG INJ IV PUSH: CPT

## 2023-04-18 PROCEDURE — 700111 HCHG RX REV CODE 636 W/ 250 OVERRIDE (IP): Performed by: EMERGENCY MEDICINE

## 2023-04-18 PROCEDURE — 85730 THROMBOPLASTIN TIME PARTIAL: CPT

## 2023-04-18 PROCEDURE — 70450 CT HEAD/BRAIN W/O DYE: CPT

## 2023-04-18 PROCEDURE — 36415 COLL VENOUS BLD VENIPUNCTURE: CPT

## 2023-04-18 PROCEDURE — 99284 EMERGENCY DEPT VISIT MOD MDM: CPT

## 2023-04-18 PROCEDURE — 96375 TX/PRO/DX INJ NEW DRUG ADDON: CPT

## 2023-04-18 PROCEDURE — 80053 COMPREHEN METABOLIC PANEL: CPT

## 2023-04-18 RX ORDER — PROCHLORPERAZINE MALEATE 10 MG
10 TABLET ORAL EVERY 6 HOURS PRN
Qty: 15 TABLET | Refills: 3 | Status: SHIPPED | OUTPATIENT
Start: 2023-04-18 | End: 2024-02-13

## 2023-04-18 RX ORDER — KETOROLAC TROMETHAMINE 30 MG/ML
30 INJECTION, SOLUTION INTRAMUSCULAR; INTRAVENOUS ONCE
Status: COMPLETED | OUTPATIENT
Start: 2023-04-18 | End: 2023-04-18

## 2023-04-18 RX ORDER — DIPHENHYDRAMINE HYDROCHLORIDE 50 MG/ML
50 INJECTION INTRAMUSCULAR; INTRAVENOUS ONCE
Status: COMPLETED | OUTPATIENT
Start: 2023-04-18 | End: 2023-04-18

## 2023-04-18 RX ORDER — LORAZEPAM 2 MG/ML
1 INJECTION INTRAMUSCULAR ONCE
Status: COMPLETED | OUTPATIENT
Start: 2023-04-18 | End: 2023-04-18

## 2023-04-18 RX ORDER — PROCHLORPERAZINE EDISYLATE 5 MG/ML
10 INJECTION INTRAMUSCULAR; INTRAVENOUS ONCE
Status: COMPLETED | OUTPATIENT
Start: 2023-04-18 | End: 2023-04-18

## 2023-04-18 RX ORDER — IBUPROFEN 200 MG
400 TABLET ORAL EVERY 6 HOURS PRN
Status: SHIPPED | COMMUNITY
End: 2024-02-13

## 2023-04-18 RX ADMIN — LORAZEPAM 1 MG: 2 INJECTION INTRAMUSCULAR; INTRAVENOUS at 16:57

## 2023-04-18 RX ADMIN — KETOROLAC TROMETHAMINE 30 MG: 30 INJECTION, SOLUTION INTRAMUSCULAR at 16:58

## 2023-04-18 RX ADMIN — DIPHENHYDRAMINE HYDROCHLORIDE 50 MG: 50 INJECTION INTRAMUSCULAR; INTRAVENOUS at 16:57

## 2023-04-18 RX ADMIN — PROCHLORPERAZINE EDISYLATE 10 MG: 5 INJECTION INTRAMUSCULAR; INTRAVENOUS at 16:56

## 2023-04-18 ASSESSMENT — LIFESTYLE VARIABLES
HAVE YOU EVER FELT YOU SHOULD CUT DOWN ON YOUR DRINKING: NO
DO YOU DRINK ALCOHOL: YES

## 2023-04-18 NOTE — ED NOTES
Med rec updated and complete, per pt   Allergies reviewed, per pt  Interviewed pt with  at bedside with permission from pt.  Pt reports no prescription medications or vitamins in the last 30 days or longer.  Pt reports no antibiotics in the last 30 days.

## 2023-04-18 NOTE — ED TRIAGE NOTES
Chief Complaint   Patient presents with    Headache     While on Ipad today at 1:30, she experienced right temporal pain and some numbness in her left hand and coolness at finger tips, she felt dizzy at that time as well. Took BP at home and this was high, she feels some lightheadedness now.    Feels panicky, tearful.    report a lot of stress at work.

## 2023-04-18 NOTE — ED TRIAGE NOTES
Chief Complaint   Patient presents with    Headache     While on Ipad today at 1:30, she experienced right temporal pain and some numbness in her left hand and coolness at finger tips, she felt dizzy at that time as well. Took BP at home and this was high, she feels some lightheadedness now.    Feels panicky, tearful.

## 2023-04-19 NOTE — ED PROVIDER NOTES
ED Provider Note    CHIEF COMPLAINT  Chief Complaint   Patient presents with    Headache     While on Ipad today at 1:30, she experienced right temporal pain and some numbness in her left hand and coolness at finger tips, she felt dizzy at that time as well. Took BP at home and this was high, she feels some lightheadedness now.       EXTERNAL RECORDS REVIEWED  Other outpatient records reviewed but there was nothing significant recently other than office visits for her migraine headaches.    HPI/ROS  LIMITATION TO HISTORY   Select: : None  OUTSIDE HISTORIAN(S):  Significant other     Kerry Champagne is a 43 y.o. female who presents tonight with her  with a chief complaint of right frontal head pressure that she believes may be sinus related, left visual abnormalities with spots before her eye, cold fingertips and dizziness.  Patient took her blood pressure and it was markedly elevated.  She now complains of lightheadedness but her visual symptoms have resolved along with her left hand coldness.  She has a known history of migraine headaches and is being treated for these with Imitrex and Zofran.  She states she currently does not have a headache or neck pain.  She denies any blurred or double vision she denies any nausea or vomiting, no recent head trauma and no fever or chills.    PAST MEDICAL HISTORY   has a past medical history of Family planning, IUD (intrauterine device) in place (4/27/2015), Migraine, and Migraine with aura and without status migrainosus, not intractable.    SURGICAL HISTORY   has a past surgical history that includes hammertoe correction (2002); other orthopedic surgery (1999); dilation and evacuation (11/2/2010); and hammertoe correction (Left, 5/22/2015).    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       CURRENT MEDICATIONS  Home Medications       Reviewed by Sierra Finley (Pharmacy Tech) on 04/18/23 at 1650  Med List Status: Complete     Medication Last Dose Status   ibuprofen (MOTRIN) 200 MG Tab > 3 days Active                    ALLERGIES  Allergies   Allergen Reactions    Lactose Diarrhea     Dairy, upset stomach    Percocet [Oxycodone-Acetaminophen] Vomiting and Nausea     'dizzy'       PHYSICAL EXAM  VITAL SIGNS: /84   Pulse 74   Temp 36.2 °C (97.2 °F) (Temporal)   Resp 15   Ht 1.626 m (5' 4\")   Wt 91.6 kg (201 lb 15.1 oz)   SpO2 94%   BMI 34.66 kg/m²    Constitutional: Patient is well developed, well nourished. Non-toxic appearing.  Moderate distress extremely anxious  HENT: Normocephalic, atraumatic. Nose normal with no mucosal edema or drainage. Oropharynx moist without erythema or exudates.  Eyes: PERRL, EOMI, Conjunctiva without erythema or exudates.   Neck: Supple with no rigidity or lymphadenopathy  Lymphatic: No lymphadenopathy noted.   Cardiovascular: Normal heart rate and Regular rhythm. No murmur  Thorax & Lungs: Clear and equal breath sounds with good excursion. No respiratory distress, no wheezing.  Abdomen: Bowel sounds normal in all four quadrants. Soft,nontender, no flank tenderness.  Skin: Warm, Dry, No erythema, No rashes.   Back: No cervical, thoracic, or lumbosacral tenderness.    Extremities: Peripheral pulses 4/4 No edema, No tenderness  Musculoskeletal: Normal range of motion in all major joints. No tenderness to palpation or major deformities noted.   Neurologic: Alert & oriented x 3, Normal motor function, Normal sensory " function, No lateralizing or focal deficits noted. DTR's 4/4 bilaterally.  Psychiatric: Affect extremely anxious.    DIAGNOSTIC STUDIES / PROCEDURE    LABS  Results for orders placed or performed during the hospital encounter of 04/18/23   CBC WITH DIFFERENTIAL   Result Value Ref Range    WBC 9.8 4.8 - 10.8 K/uL    RBC 5.04 4.20 - 5.40 M/uL    Hemoglobin 15.1 12.0 - 16.0 g/dL    Hematocrit 45.0 37.0 - 47.0 %    MCV 89.3 81.4 - 97.8 fL    MCH 30.0 27.0 - 33.0 pg    MCHC 33.6 33.6 - 35.0 g/dL    RDW 41.9 35.9 - 50.0 fL    Platelet Count 230 164 - 446 K/uL    MPV 9.8 9.0 - 12.9 fL    Neutrophils-Polys 62.60 44.00 - 72.00 %    Lymphocytes 25.50 22.00 - 41.00 %    Monocytes 7.40 0.00 - 13.40 %    Eosinophils 3.50 0.00 - 6.90 %    Basophils 0.70 0.00 - 1.80 %    Immature Granulocytes 0.30 0.00 - 0.90 %    Nucleated RBC 0.00 /100 WBC    Neutrophils (Absolute) 6.13 2.00 - 7.15 K/uL    Lymphs (Absolute) 2.50 1.00 - 4.80 K/uL    Monos (Absolute) 0.73 0.00 - 0.85 K/uL    Eos (Absolute) 0.34 0.00 - 0.51 K/uL    Baso (Absolute) 0.07 0.00 - 0.12 K/uL    Immature Granulocytes (abs) 0.03 0.00 - 0.11 K/uL    NRBC (Absolute) 0.00 K/uL   COMP METABOLIC PANEL   Result Value Ref Range    Sodium 139 135 - 145 mmol/L    Potassium 3.6 3.6 - 5.5 mmol/L    Chloride 103 96 - 112 mmol/L    Co2 21 20 - 33 mmol/L    Anion Gap 15.0 7.0 - 16.0    Glucose 115 (H) 65 - 99 mg/dL    Bun 8 8 - 22 mg/dL    Creatinine 0.89 0.50 - 1.40 mg/dL    Calcium 9.2 8.4 - 10.2 mg/dL    AST(SGOT) 16 12 - 45 U/L    ALT(SGPT) 14 2 - 50 U/L    Alkaline Phosphatase 86 30 - 99 U/L    Total Bilirubin 0.5 0.1 - 1.5 mg/dL    Albumin 4.7 3.2 - 4.9 g/dL    Total Protein 7.6 6.0 - 8.2 g/dL    Globulin 2.9 1.9 - 3.5 g/dL    A-G Ratio 1.6 g/dL   APTT   Result Value Ref Range    APTT 25.3 24.7 - 36.0 sec   PROTHROMBIN TIME (INR)   Result Value Ref Range    PT 12.9 12.0 - 14.6 sec    INR 0.98 0.87 - 1.13   CORRECTED CALCIUM   Result Value Ref Range    Correct Calcium 8.6 8.5 -  10.5 mg/dL   ESTIMATED GFR   Result Value Ref Range    GFR (CKD-EPI) 83 >60 mL/min/1.73 m 2        RADIOLOGY  I have independently interpreted the diagnostic imaging associated with this visit and am waiting the final reading from the radiologist.   My preliminary interpretation is as follows: No intracranial bleed or mass  Radiologist interpretation:   CT-HEAD W/O   Final Result      Head CT without contrast within normal limits. No evidence of acute cerebral infarction, hemorrhage or mass lesion.              COURSE & MEDICAL DECISION MAKING    ED Observation Status? Yes; I am placing the patient in to an observation status due to a diagnostic uncertainty as well as therapeutic intensity. Patient placed in observation status at 16:15 PM, 4/18/2023.     Observation plan is as follows: IV fluids, migraine cocktail, CT head, blood work    Upon Reevaluation, the patient's condition has: Improved; and will be discharged.    Patient discharged from ED Observation status at 18:25 (Time) 4/18/23 (Date).     INITIAL ASSESSMENT, COURSE AND PLAN  Care Narrative:   Patient was maintained on cardiac telemetry for her elevated blood pressure.  It was apparent she was very anxious so she was given Ativan, Toradol, Compazine, Benadryl for what appears to be an atypical migraine headache.  CT scan of her head was performed and shows no intracranial bleed or other pathology.  All of her laboratories were unremarkable as well.  She responded well to the medications and states that her head pressure and left-sided visual symptoms etc. are resolved.  She was able to tolerate p.o. fluids and she was able to ambulate with no ataxia or dizziness.  My plan will be to send her home with prescriptions for Compazine as she already has Zofran and it does not seem to help.  She is to follow-up with her primary care provider within the next 1 to 2 days for recheck, I reassured her that she was not having a stroke and that this was likely an  atypical migraine.  Blood pressure has improved to 139/84 with a heart rate of 74.  She is discharged home in the care of her  in stable and improved condition.    HYDRATION: Based on the patient's presentation of Acute Vomiting the patient was given IV fluids. IV Hydration was used because oral hydration was not adequate alone. Upon recheck following hydration, the patient was markedly improved..      ADDITIONAL PROBLEM LIST  Anxiety  DISPOSITION AND DISCUSSIONS  I have discussed management of the patient with the following physicians and JENNA's: None    Discussion of management with other Q or appropriate source(s): None     Escalation of care considered, and ultimately not performed:acute inpatient care management, however at this time, the patient is most appropriate for outpatient management    Barriers to care at this time, including but not limited to:  None .     Decision tools and prescription drugs considered including, but not limited to:  Compazine .    FINAL DIAGNOSIS  1. Atypical migraine    2. Anxiety reaction    3. Nausea           Electronically signed by: Naina Schultz D.O., 4/18/2023 10:29 PM

## 2023-04-19 NOTE — DISCHARGE INSTRUCTIONS
All of your work-up tonight has been negative.  I believe your blood pressure was elevated because of anxiety and your symptoms.  Go home, rest, increase fluids, take your migraine medications if you have recurrent headache.  Follow-up with your primary care provider within the next 2 days as needed and return if any problems or worsening.

## 2023-04-24 ENCOUNTER — OFFICE VISIT (OUTPATIENT)
Dept: MEDICAL GROUP | Facility: MEDICAL CENTER | Age: 43
End: 2023-04-24
Payer: COMMERCIAL

## 2023-04-24 VITALS
SYSTOLIC BLOOD PRESSURE: 132 MMHG | WEIGHT: 194 LBS | HEART RATE: 69 BPM | DIASTOLIC BLOOD PRESSURE: 100 MMHG | TEMPERATURE: 98.1 F | OXYGEN SATURATION: 98 % | HEIGHT: 64 IN | BODY MASS INDEX: 33.12 KG/M2 | RESPIRATION RATE: 16 BRPM

## 2023-04-24 DIAGNOSIS — Z13.1 SCREENING FOR DIABETES MELLITUS: ICD-10-CM

## 2023-04-24 DIAGNOSIS — Z11.4 SCREENING FOR HIV (HUMAN IMMUNODEFICIENCY VIRUS): ICD-10-CM

## 2023-04-24 DIAGNOSIS — G43.109 MIGRAINE WITH AURA AND WITHOUT STATUS MIGRAINOSUS, NOT INTRACTABLE: ICD-10-CM

## 2023-04-24 DIAGNOSIS — K64.9 HEMORRHOIDS, UNSPECIFIED HEMORRHOID TYPE: ICD-10-CM

## 2023-04-24 DIAGNOSIS — Z11.59 NEED FOR HEPATITIS C SCREENING TEST: ICD-10-CM

## 2023-04-24 DIAGNOSIS — Z13.220 SCREENING FOR LIPID DISORDERS: ICD-10-CM

## 2023-04-24 DIAGNOSIS — E66.9 OBESITY (BMI 30.0-34.9): ICD-10-CM

## 2023-04-24 DIAGNOSIS — R03.0 ELEVATED BLOOD PRESSURE READING: ICD-10-CM

## 2023-04-24 DIAGNOSIS — Z01.84 IMMUNITY STATUS TESTING: ICD-10-CM

## 2023-04-24 PROCEDURE — 99214 OFFICE O/P EST MOD 30 MIN: CPT | Performed by: FAMILY MEDICINE

## 2023-04-24 RX ORDER — RIZATRIPTAN BENZOATE 10 MG/1
TABLET, ORALLY DISINTEGRATING ORAL
COMMUNITY
End: 2023-04-24 | Stop reason: SDUPTHER

## 2023-04-24 RX ORDER — RIZATRIPTAN BENZOATE 10 MG/1
TABLET, ORALLY DISINTEGRATING ORAL
Qty: 30 TABLET | Refills: 3 | Status: SHIPPED | OUTPATIENT
Start: 2023-04-24 | End: 2024-03-11

## 2023-04-24 ASSESSMENT — ENCOUNTER SYMPTOMS
FEVER: 0
PALPITATIONS: 0
CHILLS: 0

## 2023-04-24 ASSESSMENT — FIBROSIS 4 INDEX: FIB4 SCORE: 0.78

## 2023-04-24 ASSESSMENT — PATIENT HEALTH QUESTIONNAIRE - PHQ9: CLINICAL INTERPRETATION OF PHQ2 SCORE: 0

## 2023-04-24 NOTE — PROGRESS NOTES
FAMILY MEDICINE VISIT                                                               Chief complaint::Diagnoses of Elevated blood pressure reading, Hemorrhoids, unspecified hemorrhoid type, Migraine with aura and without status migrainosus, not intractable, Need for hepatitis C screening test, Screening for HIV (human immunodeficiency virus), Screening for lipid disorders, Screening for diabetes mellitus, and Immunity status testing were pertinent to this visit.    History of present illness: Kerry Champagne is a 43 y.o. female who presented for ER follow-up, medication refills.    Blood pressure today came back elevated at 132/100.  She was seen in the ER for migraine headache and other symptoms.  She had CT head which came back negative for any abnormality.  Blood test were overall negative, blood sugars were elevated but they were not fasting.  She reports that she has a lot of stress going on.  She has been monitoring her blood pressure at home and reports that diastolic blood pressure is in the 80s range.  She also been experiencing blood in stools.  Describes blood as fresh blood.  No diarrhea, constipation.    Has history of migraine headaches, on rizatriptan as needed, requested refill for this prescription      Review of systems:     Review of Systems   Constitutional:  Negative for chills, fever and malaise/fatigue.   Cardiovascular:  Negative for chest pain, palpitations and leg swelling.      Medications and Allergies:     Current Outpatient Medications   Medication Sig Dispense Refill    rizatriptan (MAXALT-MLT) 10 MG disintegrating tablet Take 1 tablet by mouth daily as needed for migraine headaches, may repeat dose in 2 hours. Maximum dose 30/24 hour 30 Tablet 3    ibuprofen (MOTRIN) 200 MG Tab Take 400 mg by mouth every 6 hours as needed. Indications: Pain      prochlorperazine (COMPAZINE) 10 MG Tab Take 1 Tablet by mouth every 6 hours as needed for Nausea/Vomiting. 15 Tablet 3     No current  "facility-administered medications for this visit.          Vitals:    BP (!) 132/100   Pulse 69   Temp 36.7 °C (98.1 °F)   Resp 16   Ht 1.626 m (5' 4\")   Wt 88 kg (194 lb 0.1 oz)   SpO2 98%  Body mass index is 33.3 kg/m².    Physical Exam:     Physical Exam  Constitutional:       Appearance: Normal appearance. She is well-developed and well-groomed.   HENT:      Head: Normocephalic and atraumatic.      Right Ear: External ear normal.      Left Ear: External ear normal.   Eyes:      General:         Right eye: No discharge.         Left eye: No discharge.      Conjunctiva/sclera: Conjunctivae normal.   Cardiovascular:      Rate and Rhythm: Normal rate and regular rhythm.      Heart sounds: Normal heart sounds. No murmur heard.    No friction rub. No gallop.   Pulmonary:      Effort: Pulmonary effort is normal. No respiratory distress.      Breath sounds: Normal breath sounds. No wheezing or rales.   Musculoskeletal:         General: No deformity.      Cervical back: Neck supple.   Skin:     Findings: No rash.   Neurological:      Mental Status: She is alert.      Gait: Gait is intact.   Psychiatric:         Mood and Affect: Mood and affect normal.         Behavior: Behavior normal.        Labs:  I reviewed with patient recent labs resulted on 4/18/2023    Assessment/Plan:         1. Elevated blood pressure reading  New problem, unstable, could be due to stress.  Recommended to monitor blood pressure at home, bring blood pressure log at next visit.  Counseled regarding eating healthy diet and exercise.  Recheck thyroid function test.  Current BMI at 33.30, counseled for weight loss.    - TSH WITH REFLEX TO FT4; Future    2. Hemorrhoids, unspecified hemorrhoid type  New problem, unstable, recommended to use hemorrhoid cream over-the-counter.  Drink a lot of fluids, eat fiber in diet, if not getting better, will refer to gastro.    3. Migraine with aura and without status migrainosus, not intractable  Chronic " problem, stable, continue rizatriptan as needed.    - rizatriptan (MAXALT-MLT) 10 MG disintegrating tablet; Take 1 tablet by mouth daily as needed for migraine headaches, may repeat dose in 2 hours. Maximum dose 30/24 hour  Dispense: 30 Tablet; Refill: 3    4. Need for hepatitis C screening test  - HEP C VIRUS ANTIBODY; Future    5. Screening for HIV (human immunodeficiency virus)  - HIV AG/AB COMBO ASSAY SCREENING; Future    6. Screening for lipid disorders  - Lipid Profile; Future    7. Screening for diabetes mellitus  - HEMOGLOBIN A1C; Future    8. Immunity status testing  Check hep B titers.    Please note that this dictation was created using voice recognition software. I have made every reasonable attempt to correct obvious errors, but I expect that there are errors of grammar and possibly content that I did not discover before finalizing the note.    Follow up in 1 month for blood pressure and lab follow-up.

## 2023-04-25 ENCOUNTER — HOSPITAL ENCOUNTER (OUTPATIENT)
Dept: RADIOLOGY | Facility: MEDICAL CENTER | Age: 43
End: 2023-04-25
Attending: FAMILY MEDICINE
Payer: COMMERCIAL

## 2023-04-25 DIAGNOSIS — Z12.31 VISIT FOR SCREENING MAMMOGRAM: ICD-10-CM

## 2023-04-25 PROCEDURE — 77063 BREAST TOMOSYNTHESIS BI: CPT

## 2023-04-26 ENCOUNTER — PATIENT MESSAGE (OUTPATIENT)
Dept: MEDICAL GROUP | Facility: MEDICAL CENTER | Age: 43
End: 2023-04-26
Payer: COMMERCIAL

## 2023-04-26 DIAGNOSIS — R92.8 ABNORMALITY OF BOTH BREASTS ON SCREENING MAMMOGRAM: ICD-10-CM

## 2023-04-27 ENCOUNTER — HOSPITAL ENCOUNTER (OUTPATIENT)
Dept: RADIOLOGY | Facility: MEDICAL CENTER | Age: 43
End: 2023-04-27
Attending: FAMILY MEDICINE
Payer: COMMERCIAL

## 2023-04-27 DIAGNOSIS — R92.8 ABNORMALITY OF BOTH BREASTS ON SCREENING MAMMOGRAM: ICD-10-CM

## 2023-04-27 PROCEDURE — G0279 TOMOSYNTHESIS, MAMMO: HCPCS

## 2023-04-27 PROCEDURE — 76642 ULTRASOUND BREAST LIMITED: CPT | Mod: RT

## 2023-05-19 ENCOUNTER — HOSPITAL ENCOUNTER (OUTPATIENT)
Dept: LAB | Facility: MEDICAL CENTER | Age: 43
End: 2023-05-19
Attending: FAMILY MEDICINE
Payer: COMMERCIAL

## 2023-05-19 DIAGNOSIS — Z13.1 SCREENING FOR DIABETES MELLITUS: ICD-10-CM

## 2023-05-19 DIAGNOSIS — R03.0 ELEVATED BLOOD PRESSURE READING: ICD-10-CM

## 2023-05-19 DIAGNOSIS — Z13.220 SCREENING FOR LIPID DISORDERS: ICD-10-CM

## 2023-05-19 DIAGNOSIS — Z11.59 NEED FOR HEPATITIS C SCREENING TEST: ICD-10-CM

## 2023-05-19 DIAGNOSIS — Z11.4 SCREENING FOR HIV (HUMAN IMMUNODEFICIENCY VIRUS): ICD-10-CM

## 2023-05-19 LAB
CHOLEST SERPL-MCNC: 235 MG/DL (ref 100–199)
EST. AVERAGE GLUCOSE BLD GHB EST-MCNC: 103 MG/DL
FASTING STATUS PATIENT QL REPORTED: NORMAL
HBA1C MFR BLD: 5.2 % (ref 4–5.6)
HCV AB SER QL: NORMAL
HDLC SERPL-MCNC: 44 MG/DL
HIV 1+2 AB+HIV1 P24 AG SERPL QL IA: NORMAL
LDLC SERPL CALC-MCNC: 174 MG/DL
TRIGL SERPL-MCNC: 86 MG/DL (ref 0–149)
TSH SERPL DL<=0.005 MIU/L-ACNC: 0.79 UIU/ML (ref 0.38–5.33)

## 2023-05-19 PROCEDURE — 83036 HEMOGLOBIN GLYCOSYLATED A1C: CPT

## 2023-05-19 PROCEDURE — 86803 HEPATITIS C AB TEST: CPT

## 2023-05-19 PROCEDURE — 80061 LIPID PANEL: CPT

## 2023-05-19 PROCEDURE — 36415 COLL VENOUS BLD VENIPUNCTURE: CPT

## 2023-05-19 PROCEDURE — 84443 ASSAY THYROID STIM HORMONE: CPT

## 2023-05-19 PROCEDURE — 87389 HIV-1 AG W/HIV-1&-2 AB AG IA: CPT

## 2023-05-24 ENCOUNTER — OFFICE VISIT (OUTPATIENT)
Dept: MEDICAL GROUP | Facility: MEDICAL CENTER | Age: 43
End: 2023-05-24
Payer: COMMERCIAL

## 2023-05-24 VITALS
OXYGEN SATURATION: 95 % | HEART RATE: 89 BPM | SYSTOLIC BLOOD PRESSURE: 138 MMHG | TEMPERATURE: 98.2 F | HEIGHT: 64 IN | BODY MASS INDEX: 31.99 KG/M2 | WEIGHT: 187.39 LBS | DIASTOLIC BLOOD PRESSURE: 80 MMHG | RESPIRATION RATE: 16 BRPM

## 2023-05-24 DIAGNOSIS — R03.0 ELEVATED BLOOD PRESSURE READING: ICD-10-CM

## 2023-05-24 DIAGNOSIS — Z01.84 IMMUNITY STATUS TESTING: ICD-10-CM

## 2023-05-24 DIAGNOSIS — E78.2 MIXED HYPERLIPIDEMIA: ICD-10-CM

## 2023-05-24 PROCEDURE — 3075F SYST BP GE 130 - 139MM HG: CPT | Performed by: FAMILY MEDICINE

## 2023-05-24 PROCEDURE — 99214 OFFICE O/P EST MOD 30 MIN: CPT | Performed by: FAMILY MEDICINE

## 2023-05-24 PROCEDURE — 3079F DIAST BP 80-89 MM HG: CPT | Performed by: FAMILY MEDICINE

## 2023-05-24 ASSESSMENT — ENCOUNTER SYMPTOMS
CHILLS: 0
FEVER: 0
PALPITATIONS: 0

## 2023-05-24 ASSESSMENT — FIBROSIS 4 INDEX: FIB4 SCORE: 0.78

## 2023-05-24 NOTE — ASSESSMENT & PLAN NOTE
Chronic problem, unstable, counseled regarding eating healthy diet and exercise.  Recheck labs in 4-month.  Check lipoprotein a level also.

## 2023-05-24 NOTE — ASSESSMENT & PLAN NOTE
Chronic problem, stable, continue to be healthy diet and exercise for weight loss which will help with blood pressure.  Continue to monitor blood pressure with every visit.

## 2023-05-24 NOTE — PROGRESS NOTES
"FAMILY MEDICINE VISIT                                                               Chief complaint::Diagnoses of Mixed hyperlipidemia, Immunity status testing, and Elevated blood pressure reading were pertinent to this visit.    History of present illness: Kerry Champagne is a 43 y.o. female who presented for lab follow-up.      Problem   Mixed Hyperlipidemia      Recent cholesterol panel showed elevated total cholesterol and LDL level.  She is working on diet and exercise to improve these numbers.  Lab Results   Component Value Date/Time    CHOLSTRLTOT 235 (H) 05/19/2023 1022    TRIGLYCERIDE 86 05/19/2023 1022    HDL 44 05/19/2023 1022     (H) 05/19/2023 1022        Elevated Blood Pressure Reading    She has been checking her blood pressure at home and has been getting into 1 10-1 20/70 to 80s range.  Blood pressure today came back at 138/80.  She reports that her work is stressful            Review of systems:     Review of Systems   Constitutional:  Negative for chills, fever and malaise/fatigue.   Cardiovascular:  Negative for chest pain, palpitations and leg swelling.        Medications and Allergies:     Current Outpatient Medications   Medication Sig Dispense Refill    rizatriptan (MAXALT-MLT) 10 MG disintegrating tablet Take 1 tablet by mouth daily as needed for migraine headaches, may repeat dose in 2 hours. Maximum dose 30/24 hour 30 Tablet 3    ibuprofen (MOTRIN) 200 MG Tab Take 400 mg by mouth every 6 hours as needed. Indications: Pain      prochlorperazine (COMPAZINE) 10 MG Tab Take 1 Tablet by mouth every 6 hours as needed for Nausea/Vomiting. 15 Tablet 3     No current facility-administered medications for this visit.          Vitals:    /80   Pulse 89   Temp 36.8 °C (98.2 °F)   Resp 16   Ht 1.626 m (5' 4\")   Wt 85 kg (187 lb 6.3 oz)   SpO2 95%  Body mass index is 32.17 kg/m².    Physical Exam:     Physical Exam  Constitutional:       Appearance: Normal appearance. She is " well-developed and well-groomed.   HENT:      Head: Normocephalic and atraumatic.      Right Ear: External ear normal.      Left Ear: External ear normal.   Eyes:      General:         Right eye: No discharge.         Left eye: No discharge.      Conjunctiva/sclera: Conjunctivae normal.   Cardiovascular:      Rate and Rhythm: Normal rate.   Pulmonary:      Effort: Pulmonary effort is normal. No respiratory distress.   Musculoskeletal:      Cervical back: Neck supple.   Skin:     Findings: No rash.   Neurological:      Mental Status: She is alert.   Psychiatric:         Mood and Affect: Mood and affect normal.         Behavior: Behavior normal.          Labs:  I reviewed with patient recent labs resulted on 5/19/2023.    Assessment/Plan:         Problem List Items Addressed This Visit       Mixed hyperlipidemia     Chronic problem, unstable, counseled regarding eating healthy diet and exercise.  Recheck labs in 4-month.  Check lipoprotein a level also.           Relevant Orders    Lipid Profile    Lipoprotein (a)    Elevated blood pressure reading     Chronic problem, stable, continue to be healthy diet and exercise for weight loss which will help with blood pressure.  Continue to monitor blood pressure with every visit.            Other Visit Diagnoses       Immunity status testing        Relevant Orders    HEP B SURFACE AB             Please note that this dictation was created using voice recognition software. I have made every reasonable attempt to correct obvious errors, but I expect that there are errors of grammar and possibly content that I did not discover before finalizing the note.    Follow up in 4 months for lab follow-up.

## 2023-09-20 ENCOUNTER — HOSPITAL ENCOUNTER (OUTPATIENT)
Dept: LAB | Facility: MEDICAL CENTER | Age: 43
End: 2023-09-20
Attending: FAMILY MEDICINE
Payer: COMMERCIAL

## 2023-09-20 DIAGNOSIS — Z01.84 IMMUNITY STATUS TESTING: ICD-10-CM

## 2023-09-20 DIAGNOSIS — E78.2 MIXED HYPERLIPIDEMIA: ICD-10-CM

## 2023-09-20 PROCEDURE — 83695 ASSAY OF LIPOPROTEIN(A): CPT

## 2023-09-20 PROCEDURE — 80061 LIPID PANEL: CPT

## 2023-09-20 PROCEDURE — 86706 HEP B SURFACE ANTIBODY: CPT

## 2023-09-20 PROCEDURE — 36415 COLL VENOUS BLD VENIPUNCTURE: CPT

## 2023-09-21 LAB
CHOLEST SERPL-MCNC: 225 MG/DL (ref 100–199)
FASTING STATUS PATIENT QL REPORTED: NORMAL
HBV SURFACE AB SERPL IA-ACNC: 106 MIU/ML (ref 0–10)
HDLC SERPL-MCNC: 48 MG/DL
LDLC SERPL CALC-MCNC: 159 MG/DL
TRIGL SERPL-MCNC: 91 MG/DL (ref 0–149)

## 2023-09-22 LAB — LPA SERPL-MCNC: <6 MG/DL

## 2023-09-25 ENCOUNTER — APPOINTMENT (OUTPATIENT)
Dept: MEDICAL GROUP | Facility: MEDICAL CENTER | Age: 43
End: 2023-09-25
Payer: COMMERCIAL

## 2023-09-26 ENCOUNTER — OFFICE VISIT (OUTPATIENT)
Dept: MEDICAL GROUP | Facility: MEDICAL CENTER | Age: 43
End: 2023-09-26
Payer: COMMERCIAL

## 2023-09-26 VITALS
SYSTOLIC BLOOD PRESSURE: 126 MMHG | TEMPERATURE: 98.2 F | OXYGEN SATURATION: 97 % | HEART RATE: 79 BPM | DIASTOLIC BLOOD PRESSURE: 70 MMHG | RESPIRATION RATE: 16 BRPM | BODY MASS INDEX: 27.83 KG/M2 | HEIGHT: 64 IN | WEIGHT: 163 LBS

## 2023-09-26 DIAGNOSIS — Z23 NEED FOR VACCINATION: ICD-10-CM

## 2023-09-26 DIAGNOSIS — E55.9 VITAMIN D INSUFFICIENCY: ICD-10-CM

## 2023-09-26 DIAGNOSIS — E78.2 MIXED HYPERLIPIDEMIA: ICD-10-CM

## 2023-09-26 PROCEDURE — 3074F SYST BP LT 130 MM HG: CPT | Performed by: FAMILY MEDICINE

## 2023-09-26 PROCEDURE — 99214 OFFICE O/P EST MOD 30 MIN: CPT | Mod: 25 | Performed by: FAMILY MEDICINE

## 2023-09-26 PROCEDURE — 3078F DIAST BP <80 MM HG: CPT | Performed by: FAMILY MEDICINE

## 2023-09-26 PROCEDURE — 90686 IIV4 VACC NO PRSV 0.5 ML IM: CPT | Performed by: FAMILY MEDICINE

## 2023-09-26 PROCEDURE — 90471 IMMUNIZATION ADMIN: CPT | Performed by: FAMILY MEDICINE

## 2023-09-26 ASSESSMENT — ENCOUNTER SYMPTOMS
CHILLS: 0
FEVER: 0
PALPITATIONS: 0

## 2023-09-26 ASSESSMENT — FIBROSIS 4 INDEX: FIB4 SCORE: 0.78

## 2023-09-26 NOTE — ASSESSMENT & PLAN NOTE
Chronic problem, unstable, although got better when compared to previous numbers.  Discussed to be cautious about compound semaglutide due to hits risk.  Continue to eat healthy diet and exercise.  Recheck labs in 6-month.

## 2023-09-26 NOTE — PROGRESS NOTES
"FAMILY MEDICINE VISIT                                                               Chief complaint::Diagnoses of Mixed hyperlipidemia, Vitamin D insufficiency, and Need for vaccination were pertinent to this visit.    History of present illness: Kerry Champagne is a 43 y.o. female who presented for lab follow up.    Problem   Mixed Hyperlipidemia      Recent cholesterol panel showed improvement in total cholesterol and LDL level.  She reports that she is taking compound semaglutide which helped her lose about 20 pounds.  Her appetite is getting less and she is consuming much less calories      Lab Results   Component Value Date/Time    CHOLSTRLTOT 225 (H) 09/20/2023 0956    TRIGLYCERIDE 91 09/20/2023 0956    HDL 48 09/20/2023 0956     (H) 09/20/2023 0956        Vitamin D Insufficiency      Last vitamin D level came back at 40.  Component      Latest Ref Rng 11/30/2020   25-Hydroxy Vitamin D 25      30 - 100 ng/mL 40    25-Hydroxy Vitamin D 25      30 - 100 ng/mL 39                    Review of systems:     Review of Systems   Constitutional:  Negative for chills, fever and malaise/fatigue.   Cardiovascular:  Negative for chest pain, palpitations and leg swelling.        Medications and Allergies:     Current Outpatient Medications   Medication Sig Dispense Refill    rizatriptan (MAXALT-MLT) 10 MG disintegrating tablet Take 1 tablet by mouth daily as needed for migraine headaches, may repeat dose in 2 hours. Maximum dose 30/24 hour 30 Tablet 3    ibuprofen (MOTRIN) 200 MG Tab Take 400 mg by mouth every 6 hours as needed. Indications: Pain      prochlorperazine (COMPAZINE) 10 MG Tab Take 1 Tablet by mouth every 6 hours as needed for Nausea/Vomiting. 15 Tablet 3     No current facility-administered medications for this visit.          Vitals:    /70   Pulse 79   Temp 36.8 °C (98.2 °F)   Resp 16   Ht 1.626 m (5' 4\")   Wt 73.9 kg (163 lb)   SpO2 97%  Body mass index is 27.98 " kg/m².    Physical Exam:     Physical Exam  Constitutional:       Appearance: Normal appearance. She is well-developed and well-groomed.   HENT:      Head: Normocephalic and atraumatic.      Right Ear: External ear normal.      Left Ear: External ear normal.   Eyes:      General:         Right eye: No discharge.         Left eye: No discharge.      Conjunctiva/sclera: Conjunctivae normal.   Cardiovascular:      Rate and Rhythm: Normal rate.   Pulmonary:      Effort: Pulmonary effort is normal. No respiratory distress.   Musculoskeletal:      Cervical back: Neck supple.   Skin:     Findings: No rash.   Neurological:      Mental Status: She is alert.   Psychiatric:         Mood and Affect: Mood and affect normal.         Behavior: Behavior normal.          Labs:  I reviewed with patient recent labs resulted on 9/20/2023.    Assessment/Plan:         Problem List Items Addressed This Visit       Mixed hyperlipidemia     Chronic problem, unstable, although got better when compared to previous numbers.  Discussed to be cautious about compound semaglutide due to hits risk.  Continue to eat healthy diet and exercise.  Recheck labs in 6-month.         Relevant Orders    Comp Metabolic Panel    Lipid Profile    Vitamin D insufficiency     Chronic problem, stable, recheck vitamin D level to assess control.         Relevant Orders    VITAMIN D,25 HYDROXY (DEFICIENCY)     Other Visit Diagnoses       Need for vaccination        Relevant Orders    INFLUENZA VACCINE QUAD INJ (PF) (Completed)             Please note that this dictation was created using voice recognition software. I have made every reasonable attempt to correct obvious errors, but I expect that there are errors of grammar and possibly content that I did not discover before finalizing the note.    Follow up in 6 months for lab follow-up.

## 2023-12-22 ENCOUNTER — OFFICE VISIT (OUTPATIENT)
Dept: URGENT CARE | Facility: CLINIC | Age: 43
End: 2023-12-22
Payer: COMMERCIAL

## 2023-12-22 VITALS
SYSTOLIC BLOOD PRESSURE: 128 MMHG | BODY MASS INDEX: 29.02 KG/M2 | OXYGEN SATURATION: 99 % | RESPIRATION RATE: 14 BRPM | TEMPERATURE: 98.3 F | HEIGHT: 64 IN | WEIGHT: 170 LBS | DIASTOLIC BLOOD PRESSURE: 88 MMHG | HEART RATE: 72 BPM

## 2023-12-22 DIAGNOSIS — J06.9 VIRAL URI: ICD-10-CM

## 2023-12-22 DIAGNOSIS — H66.92 ACUTE LEFT OTITIS MEDIA: ICD-10-CM

## 2023-12-22 PROCEDURE — 3074F SYST BP LT 130 MM HG: CPT | Performed by: STUDENT IN AN ORGANIZED HEALTH CARE EDUCATION/TRAINING PROGRAM

## 2023-12-22 PROCEDURE — 3079F DIAST BP 80-89 MM HG: CPT | Performed by: STUDENT IN AN ORGANIZED HEALTH CARE EDUCATION/TRAINING PROGRAM

## 2023-12-22 PROCEDURE — 99213 OFFICE O/P EST LOW 20 MIN: CPT | Performed by: STUDENT IN AN ORGANIZED HEALTH CARE EDUCATION/TRAINING PROGRAM

## 2023-12-22 RX ORDER — AMOXICILLIN AND CLAVULANATE POTASSIUM 875; 125 MG/1; MG/1
1 TABLET, FILM COATED ORAL 2 TIMES DAILY
Qty: 14 TABLET | Refills: 0 | Status: SHIPPED | OUTPATIENT
Start: 2023-12-22 | End: 2023-12-29

## 2023-12-22 ASSESSMENT — FIBROSIS 4 INDEX: FIB4 SCORE: 0.78

## 2023-12-22 NOTE — PROGRESS NOTES
"Subjective:   Kerry Champagne is a 43 y.o. female who presents for Pharyngitis (Headache, runny nose, chills x Sunday )      HPI:  43-year-old female presents to the urgent care for 5 days of nasal congestion, runny nose, intermittent headache, sore throat, fatigue, and left ear pain.  Left ear pain started over the past few days.  No one else at home with similar symptoms.  Using OTC cold medication.        Medications:    amoxicillin-clavulanate Tabs  ibuprofen Tabs  prochlorperazine Tabs  rizatriptan    Allergies: Lactose and Percocet [oxycodone-acetaminophen]    Problem List: Kerry Champagne does not have any pertinent problems on file.    Surgical History:  Past Surgical History:   Procedure Laterality Date    HAMMERTOE CORRECTION Left 5/22/2015    Procedure: HAMMERTOE CORRECTION: WITH PERCUTANEOUS FIXATION 4TH;  Surgeon: John Lorenzo D.P.M.;  Location: SURGERY SAME DAY [facility];  Service:     DILATION AND EVACUATION  11/2/2010    Performed by ANAHI RICHEY at SURGERY [facility]    HAMMERTOE CORRECTION  2002    OTHER ORTHOPEDIC SURGERY  1999    foot surgery       Past Social Hx: Kerry Champagne  reports that she has never smoked. She has never used smokeless tobacco. She reports current alcohol use. She reports that she does not use drugs.     Past Family Hx:  Kerry Champagne family history includes Hypertension in her maternal grandmother and mother; Stroke in her maternal grandfather.     Problem list, medications, and allergies reviewed by myself today in Epic.     Objective:     /88 (BP Location: Left arm, Patient Position: Sitting, BP Cuff Size: Adult)   Pulse 72   Temp 36.8 °C (98.3 °F) (Temporal)   Resp 14   Ht 1.626 m (5' 4\")   Wt 77.1 kg (170 lb)   SpO2 99%   BMI 29.18 kg/m²     Physical Exam  Vitals reviewed.   Constitutional:       Appearance: Normal appearance.   HENT:      Right Ear: Hearing, tympanic membrane, ear canal and external ear " normal.      Left Ear: Hearing, ear canal and external ear normal.  No middle ear effusion. Tympanic membrane is injected and erythematous. Tympanic membrane is not bulging.      Nose: Congestion and rhinorrhea present.      Mouth/Throat:      Mouth: Mucous membranes are moist.      Pharynx: No oropharyngeal exudate or posterior oropharyngeal erythema.   Eyes:      Conjunctiva/sclera: Conjunctivae normal.      Pupils: Pupils are equal, round, and reactive to light.   Cardiovascular:      Rate and Rhythm: Normal rate and regular rhythm.      Pulses: Normal pulses.      Heart sounds: Normal heart sounds. No murmur heard.  Pulmonary:      Effort: Pulmonary effort is normal. No respiratory distress.      Breath sounds: Normal breath sounds. No stridor. No wheezing, rhonchi or rales.   Neurological:      Mental Status: She is alert.         Assessment/Plan:     Diagnosis and associated orders:     1. Viral URI        2. Acute left otitis media  amoxicillin-clavulanate (AUGMENTIN) 875-125 MG Tab         Comments/MDM:     Presentation physical exam findings consistent with a viral infection.  This should be self-limited.  I do not suspect bacterial sinus infection given the length of symptoms.  Physical exam does show acute left otitis media.  Patient be treated with Augmentin.  No otitis media to the right side.  Pulmonary exam shows no abnormal findings.  No signs of pneumonia.  Patient is well-appearing and nontoxic.  Vitals are stable.  Return precautions given.  Discussed home supportive care.  Advised Flonase and Mucinex.         Differential diagnosis, natural history, supportive care, and indications for immediate follow-up discussed.    Advised the patient to follow-up with the primary care physician for recheck, reevaluation, and consideration of further management.    Please note that this dictation was created using voice recognition software. I have made a reasonable attempt to correct obvious errors, but I  expect that there are errors of grammar and possibly content that I did not discover before finalizing the note.    Electronically signed by Carlyle Conte PA-C.

## 2024-02-12 ENCOUNTER — OFFICE VISIT (OUTPATIENT)
Dept: URGENT CARE | Facility: CLINIC | Age: 44
End: 2024-02-12
Payer: COMMERCIAL

## 2024-02-12 VITALS
SYSTOLIC BLOOD PRESSURE: 166 MMHG | TEMPERATURE: 98.9 F | BODY MASS INDEX: 29.02 KG/M2 | HEIGHT: 64 IN | HEART RATE: 76 BPM | RESPIRATION RATE: 16 BRPM | OXYGEN SATURATION: 96 % | DIASTOLIC BLOOD PRESSURE: 96 MMHG | WEIGHT: 170 LBS

## 2024-02-12 DIAGNOSIS — G43.809 OTHER MIGRAINE WITHOUT STATUS MIGRAINOSUS, NOT INTRACTABLE: ICD-10-CM

## 2024-02-12 PROCEDURE — 3077F SYST BP >= 140 MM HG: CPT

## 2024-02-12 PROCEDURE — 3080F DIAST BP >= 90 MM HG: CPT

## 2024-02-12 PROCEDURE — 99213 OFFICE O/P EST LOW 20 MIN: CPT

## 2024-02-12 ASSESSMENT — VISUAL ACUITY
OD_CC: 20/50
OS_CC: 20/30

## 2024-02-12 ASSESSMENT — FIBROSIS 4 INDEX: FIB4 SCORE: 0.78

## 2024-02-13 ENCOUNTER — HOSPITAL ENCOUNTER (EMERGENCY)
Facility: MEDICAL CENTER | Age: 44
End: 2024-02-13
Attending: EMERGENCY MEDICINE
Payer: COMMERCIAL

## 2024-02-13 ENCOUNTER — APPOINTMENT (OUTPATIENT)
Dept: RADIOLOGY | Facility: MEDICAL CENTER | Age: 44
End: 2024-02-13
Attending: EMERGENCY MEDICINE
Payer: COMMERCIAL

## 2024-02-13 VITALS
HEIGHT: 64 IN | SYSTOLIC BLOOD PRESSURE: 150 MMHG | OXYGEN SATURATION: 91 % | RESPIRATION RATE: 16 BRPM | TEMPERATURE: 98.6 F | DIASTOLIC BLOOD PRESSURE: 82 MMHG | HEART RATE: 63 BPM | WEIGHT: 188.05 LBS | BODY MASS INDEX: 32.11 KG/M2

## 2024-02-13 DIAGNOSIS — G43.109 COMPLICATED MIGRAINE: ICD-10-CM

## 2024-02-13 LAB
ALBUMIN SERPL BCP-MCNC: 4.4 G/DL (ref 3.2–4.9)
ALBUMIN/GLOB SERPL: 1.5 G/DL
ALP SERPL-CCNC: 77 U/L (ref 30–99)
ALT SERPL-CCNC: 15 U/L (ref 2–50)
ANION GAP SERPL CALC-SCNC: 10 MMOL/L (ref 7–16)
APTT PPP: 28.7 SEC (ref 24.7–36)
AST SERPL-CCNC: 16 U/L (ref 12–45)
BASOPHILS # BLD AUTO: 1.1 % (ref 0–1.8)
BASOPHILS # BLD: 0.07 K/UL (ref 0–0.12)
BILIRUB SERPL-MCNC: 0.6 MG/DL (ref 0.1–1.5)
BUN SERPL-MCNC: 8 MG/DL (ref 8–22)
CALCIUM ALBUM COR SERPL-MCNC: 8.5 MG/DL (ref 8.5–10.5)
CALCIUM SERPL-MCNC: 8.8 MG/DL (ref 8.4–10.2)
CHLORIDE SERPL-SCNC: 102 MMOL/L (ref 96–112)
CO2 SERPL-SCNC: 25 MMOL/L (ref 20–33)
CREAT SERPL-MCNC: 0.89 MG/DL (ref 0.5–1.4)
EKG IMPRESSION: NORMAL
EOSINOPHIL # BLD AUTO: 0.46 K/UL (ref 0–0.51)
EOSINOPHIL NFR BLD: 7.4 % (ref 0–6.9)
ERYTHROCYTE [DISTWIDTH] IN BLOOD BY AUTOMATED COUNT: 42.1 FL (ref 35.9–50)
GFR SERPLBLD CREATININE-BSD FMLA CKD-EPI: 82 ML/MIN/1.73 M 2
GLOBULIN SER CALC-MCNC: 3 G/DL (ref 1.9–3.5)
GLUCOSE SERPL-MCNC: 85 MG/DL (ref 65–99)
HCT VFR BLD AUTO: 45.7 % (ref 37–47)
HGB BLD-MCNC: 14.8 G/DL (ref 12–16)
IMM GRANULOCYTES # BLD AUTO: 0.01 K/UL (ref 0–0.11)
IMM GRANULOCYTES NFR BLD AUTO: 0.2 % (ref 0–0.9)
INR PPP: 0.93 (ref 0.87–1.13)
LYMPHOCYTES # BLD AUTO: 2.01 K/UL (ref 1–4.8)
LYMPHOCYTES NFR BLD: 32.5 % (ref 22–41)
MCH RBC QN AUTO: 29.7 PG (ref 27–33)
MCHC RBC AUTO-ENTMCNC: 32.4 G/DL (ref 32.2–35.5)
MCV RBC AUTO: 91.6 FL (ref 81.4–97.8)
MONOCYTES # BLD AUTO: 0.57 K/UL (ref 0–0.85)
MONOCYTES NFR BLD AUTO: 9.2 % (ref 0–13.4)
NEUTROPHILS # BLD AUTO: 3.06 K/UL (ref 1.82–7.42)
NEUTROPHILS NFR BLD: 49.6 % (ref 44–72)
NRBC # BLD AUTO: 0 K/UL
NRBC BLD-RTO: 0 /100 WBC (ref 0–0.2)
PLATELET # BLD AUTO: 248 K/UL (ref 164–446)
PMV BLD AUTO: 9.1 FL (ref 9–12.9)
POTASSIUM SERPL-SCNC: 3.8 MMOL/L (ref 3.6–5.5)
PROT SERPL-MCNC: 7.4 G/DL (ref 6–8.2)
PROTHROMBIN TIME: 13 SEC (ref 12–14.6)
RBC # BLD AUTO: 4.99 M/UL (ref 4.2–5.4)
SODIUM SERPL-SCNC: 137 MMOL/L (ref 135–145)
TROPONIN T SERPL-MCNC: <6 NG/L (ref 6–19)
WBC # BLD AUTO: 6.2 K/UL (ref 4.8–10.8)

## 2024-02-13 PROCEDURE — 85610 PROTHROMBIN TIME: CPT

## 2024-02-13 PROCEDURE — 96374 THER/PROPH/DIAG INJ IV PUSH: CPT

## 2024-02-13 PROCEDURE — 93005 ELECTROCARDIOGRAM TRACING: CPT | Performed by: EMERGENCY MEDICINE

## 2024-02-13 PROCEDURE — 36415 COLL VENOUS BLD VENIPUNCTURE: CPT

## 2024-02-13 PROCEDURE — 99284 EMERGENCY DEPT VISIT MOD MDM: CPT

## 2024-02-13 PROCEDURE — 84484 ASSAY OF TROPONIN QUANT: CPT

## 2024-02-13 PROCEDURE — 96375 TX/PRO/DX INJ NEW DRUG ADDON: CPT

## 2024-02-13 PROCEDURE — 70450 CT HEAD/BRAIN W/O DYE: CPT

## 2024-02-13 PROCEDURE — 80053 COMPREHEN METABOLIC PANEL: CPT

## 2024-02-13 PROCEDURE — 85730 THROMBOPLASTIN TIME PARTIAL: CPT

## 2024-02-13 PROCEDURE — 700111 HCHG RX REV CODE 636 W/ 250 OVERRIDE (IP): Performed by: EMERGENCY MEDICINE

## 2024-02-13 PROCEDURE — 85025 COMPLETE CBC W/AUTO DIFF WBC: CPT

## 2024-02-13 RX ORDER — ACETAMINOPHEN 500 MG
1000 TABLET ORAL EVERY 6 HOURS PRN
COMMUNITY

## 2024-02-13 RX ORDER — DIPHENHYDRAMINE HYDROCHLORIDE 50 MG/ML
25 INJECTION INTRAMUSCULAR; INTRAVENOUS ONCE
Status: COMPLETED | OUTPATIENT
Start: 2024-02-13 | End: 2024-02-13

## 2024-02-13 RX ORDER — METOCLOPRAMIDE HYDROCHLORIDE 5 MG/ML
10 INJECTION INTRAMUSCULAR; INTRAVENOUS ONCE
Status: COMPLETED | OUTPATIENT
Start: 2024-02-13 | End: 2024-02-13

## 2024-02-13 RX ORDER — KETOROLAC TROMETHAMINE 15 MG/ML
15 INJECTION, SOLUTION INTRAMUSCULAR; INTRAVENOUS ONCE
Status: COMPLETED | OUTPATIENT
Start: 2024-02-13 | End: 2024-02-13

## 2024-02-13 RX ORDER — DEXAMETHASONE SODIUM PHOSPHATE 10 MG/ML
10 INJECTION, SOLUTION INTRAMUSCULAR; INTRAVENOUS ONCE
Status: COMPLETED | OUTPATIENT
Start: 2024-02-13 | End: 2024-02-13

## 2024-02-13 RX ADMIN — KETOROLAC TROMETHAMINE 15 MG: 15 INJECTION, SOLUTION INTRAMUSCULAR; INTRAVENOUS at 12:15

## 2024-02-13 RX ADMIN — DEXAMETHASONE SODIUM PHOSPHATE 10 MG: 10 INJECTION, SOLUTION INTRAMUSCULAR; INTRAVENOUS at 10:35

## 2024-02-13 RX ADMIN — DIPHENHYDRAMINE HYDROCHLORIDE 25 MG: 50 INJECTION, SOLUTION INTRAMUSCULAR; INTRAVENOUS at 10:33

## 2024-02-13 RX ADMIN — METOCLOPRAMIDE 10 MG: 5 INJECTION, SOLUTION INTRAMUSCULAR; INTRAVENOUS at 10:30

## 2024-02-13 ASSESSMENT — FIBROSIS 4 INDEX: FIB4 SCORE: 0.78

## 2024-02-13 NOTE — ED PROVIDER NOTES
ED Provider Note    CHIEF COMPLAINT  Chief Complaint   Patient presents with    Migraine     Started Friday  Hx of same    N/V     Since Friday  Has been taking OTC medications w/o relief       EXTERNAL RECORDS REVIEWED  Outpatient Notes urgent care note from yesterday where patient was evaluated for possible migraine referred to optometry.    HPI/ROS  LIMITATION TO HISTORY   Select: : None  OUTSIDE HISTORIAN(S):   at bedside provides additional details to patient sleeping for several hours last night with very minimal improvement    Kerry Champagne is a 43 y.o. female who presents to the emergency department with chief complaint of visual changes.  Patient reports over the last 2 days she has had intermittent nausea and vomiting and reports that the vision out of her left eye appears blurred.  She states that when watching TV she first noticed that she was having a hard time making out faces.  She also has had what she describes as slow speech without any dysarthria and some slight pressure anteriorly in her head as well as some what she describes as tension in her upper chest and shoulders.  Patient does have previous history of complicated migraine which time she stated that she was having very similar symptoms however she reports that she also has had very high blood pressure over the last 2 days.    PAST MEDICAL HISTORY   has a past medical history of Family planning, IUD (intrauterine device) in place (4/27/2015), Migraine, and Migraine with aura and without status migrainosus, not intractable.    SURGICAL HISTORY   has a past surgical history that includes hammertoe correction (2002); other orthopedic surgery (1999); dilation and evacuation (11/2/2010); and hammertoe correction (Left, 5/22/2015).    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       CURRENT MEDICATIONS  Home Medications    **Home medications have not yet been reviewed for this encounter**         ALLERGIES  Allergies   Allergen Reactions    Lactose Diarrhea     Dairy, upset stomach    Percocet [Oxycodone-Acetaminophen] Vomiting and Nausea     'dizzy'       PHYSICAL EXAM  VITAL SIGNS: BP (!) 183/120   Pulse 76   Temp 36.9 °C (98.5 °F) (Temporal)   Resp 16   Ht 1.626 m (5' 4\")   Wt 85.3 kg (188 lb 0.8 oz)   LMP 12/13/2023 (Exact Date)   SpO2 97%   BMI 32.28 kg/m²      Pulse ox interpretation: I interpret this pulse ox as normal.  Constitutional: Alert and oriented x 3, istress  HEENT: Atraumatic normocephalic, pupils are equal round reactive to light extraocular movements are intact. The nares is clear, external ears are normal, mouth shows moist mucous membranes normal dentition for age  Neck: Supple, no JVD no tracheal deviation  Cardiovascular: Regular rate and rhythm no murmur rub or gallop 2+ pulses peripherally x4  Thorax & Lungs: No respiratory distress, no wheezes rales or rhonchi, No chest tenderness.   GI: Soft nontender nondistended positive bowel sounds, no peritoneal signs  Skin: Warm dry no acute rash or lesion  Musculoskeletal: Moving all extremities with full range and 5 of 5 strength no acute  deformity  Neurologic: Cranial nerves III through XII are grossly intact no sensory deficit no cerebellar dysfunction   Psychiatric: Appropriate affect for situation at this time      DIAGNOSTIC STUDIES / PROCEDURES  Results for orders placed or performed during the hospital encounter of 02/13/24   CBC WITH DIFFERENTIAL   Result Value Ref Range    WBC 6.2 4.8 - 10.8 K/uL    RBC " 4.99 4.20 - 5.40 M/uL    Hemoglobin 14.8 12.0 - 16.0 g/dL    Hematocrit 45.7 37.0 - 47.0 %    MCV 91.6 81.4 - 97.8 fL    MCH 29.7 27.0 - 33.0 pg    MCHC 32.4 32.2 - 35.5 g/dL    RDW 42.1 35.9 - 50.0 fL    Platelet Count 248 164 - 446 K/uL    MPV 9.1 9.0 - 12.9 fL    Neutrophils-Polys 49.60 44.00 - 72.00 %    Lymphocytes 32.50 22.00 - 41.00 %    Monocytes 9.20 0.00 - 13.40 %    Eosinophils 7.40 (H) 0.00 - 6.90 %    Basophils 1.10 0.00 - 1.80 %    Immature Granulocytes 0.20 0.00 - 0.90 %    Nucleated RBC 0.00 0.00 - 0.20 /100 WBC    Neutrophils (Absolute) 3.06 1.82 - 7.42 K/uL    Lymphs (Absolute) 2.01 1.00 - 4.80 K/uL    Monos (Absolute) 0.57 0.00 - 0.85 K/uL    Eos (Absolute) 0.46 0.00 - 0.51 K/uL    Baso (Absolute) 0.07 0.00 - 0.12 K/uL    Immature Granulocytes (abs) 0.01 0.00 - 0.11 K/uL    NRBC (Absolute) 0.00 K/uL   COMP METABOLIC PANEL   Result Value Ref Range    Sodium 137 135 - 145 mmol/L    Potassium 3.8 3.6 - 5.5 mmol/L    Chloride 102 96 - 112 mmol/L    Co2 25 20 - 33 mmol/L    Anion Gap 10.0 7.0 - 16.0    Glucose 85 65 - 99 mg/dL    Bun 8 8 - 22 mg/dL    Creatinine 0.89 0.50 - 1.40 mg/dL    Calcium 8.8 8.4 - 10.2 mg/dL    Correct Calcium 8.5 8.5 - 10.5 mg/dL    AST(SGOT) 16 12 - 45 U/L    ALT(SGPT) 15 2 - 50 U/L    Alkaline Phosphatase 77 30 - 99 U/L    Total Bilirubin 0.6 0.1 - 1.5 mg/dL    Albumin 4.4 3.2 - 4.9 g/dL    Total Protein 7.4 6.0 - 8.2 g/dL    Globulin 3.0 1.9 - 3.5 g/dL    A-G Ratio 1.5 g/dL   TROPONIN   Result Value Ref Range    Troponin T <6 6 - 19 ng/L   APTT   Result Value Ref Range    APTT 28.7 24.7 - 36.0 sec   PROTHROMBIN TIME   Result Value Ref Range    PT 13.0 12.0 - 14.6 sec    INR 0.93 0.87 - 1.13   ESTIMATED GFR   Result Value Ref Range    GFR (CKD-EPI) 82 >60 mL/min/1.73 m 2   EKG   Result Value Ref Range    Report       [facility] Emergency Dept.    Test Date:  2024-02-13  Pt Name:    BREANNE KRUSE              Department: [facility]  MRN:        [id number]                       [facility]  Gender:     Female                       Technician: [id number]  :        [id number]                   Requested By:WYATT WEISS  Order #:    [id number]                    Reading MD: WYATT WEISS MD    Measurements  Intervals                                Axis  Rate:       61                           P:          40  WA:         157                          QRS:        -5  QRSD:       101                          T:          30  QT:         416  QTc:        419    Interpretive Statements  Sinus rhythm  Atrial premature complex  Anterior infarct, old  No previous ECG available for comparison  Electronically Signed On 2024 15:04:42 PST by WYATT WEISS MD          RADIOLOGY  I have independently interpreted the diagnostic imaging associated with this visit and am waiting the final reading from the radiologist.   My preliminary interpretation is as follows:   No acute intracranial hemorrhage or mass effect    Radiologist interpretation:   CT-HEAD W/O   Final Result      No evidence of acute intracranial process.               COURSE & MEDICAL DECISION MAKING    ED Observation Status? No; Patient does not meet criteria for ED Observation.     ASSESSMENT, COURSE AND PLAN  Care Narrative: Troponins negative renal functions normal patient is quite hypertensive at arrival which is likely causing some minimal repolarization abnormality in the anterior leads of the EKG.  She is having no chest pain at this time.  Her symptoms all resolved with a migraine cocktail including vast improvement of her vision.  Patient does have prefers appointment with ophthalmology this afternoon.  Patient is given instructions to follow-up as scheduled with ophthalmology.  She is given instructions on rest oral hydration and avoidance of caffeine and alcohol.  Given instructions to return here for worsening pain any further vision changes any other acute symptom change or  "concerns she will otherwise be referred to neurology as well as to follow-up with this ophthalmologist this afternoon.  Discharged in stable and improved condition.  HTN/IDDM FOLLOW UP:  The patient has known hypertension and is being followed by their primary care doctor      ADDITIONAL PROBLEM LIST    DISPOSITION AND DISCUSSIONS  I have discussed management of the patient with the following physicians and JENNA's:      Discussion of management with other QHP or appropriate source(s):      Escalation of care considered, and ultimately not performed:acute inpatient care management, however at this time, the patient is most appropriate for outpatient management    Barriers to care at this time, including but not limited to: .     Decision tools and prescription drugs considered including, but not limited to: .  BP (!) 150/82   Pulse 63   Temp 37 °C (98.6 °F) (Temporal)   Resp 16   Ht 1.626 m (5' 4\")   Wt 85.3 kg (188 lb 0.8 oz)   LMP 12/13/2023 (Exact Date)   SpO2 91%   BMI 32.28 kg/m²       Darryl Padilla M.D.  [address]  [address]  [place] [address]  [phone]          [facility], Emergency Dept  [address]  [place] [address] [phone]    in 12-24 hours if symptoms persist, immediately If symptoms worsen, or if you develop any other symptoms or concerns  '  FINAL DIAGNOSIS  1. Complicated migraine Active          Electronically signed by: Justyn Crow M.D.   "

## 2024-02-13 NOTE — ED NOTES
"Patient states migraine started Friday afternoon with visual \"auras\" and nausea when eyes are open.    "

## 2024-02-13 NOTE — ED NOTES
Medication history reviewed with pt. Med rec is complete.  Allergies reviewed, per pt  Interviewed pt with  at bedside with permission from pt.    Patient has not had any outpatient antibiotics in the last 30 days.    Pt is not on any anticoagulants

## 2024-02-13 NOTE — ED NOTES
EKG completed.  PIV placed in LAC, blood drawn and sent to lab.  Pt medicated as ordered.  Pt and visitor updated on POC including pending tests and chart review by ERP.

## 2024-02-13 NOTE — ED TRIAGE NOTES
"Chief Complaint   Patient presents with    Migraine     Started Friday  Hx of same    N/V     Since Friday  Has been taking OTC medications w/o relief     BP (!) 183/120   Pulse 76   Temp 36.9 °C (98.5 °F) (Temporal)   Resp 16   Ht 1.626 m (5' 4\")   Wt 85.3 kg (188 lb 0.8 oz)   LMP 12/13/2023 (Exact Date)   SpO2 97%   BMI 32.28 kg/m²     Pt ambulated to ED w/ visitor for c/o ongoing Migraine w/ hx of since Friday.  Pt states has tried OTC medications w/o relief.  Pt went to  yesterday and received a Neurology referral.    "

## 2024-02-15 NOTE — PROGRESS NOTES
"Chief Complaint   Patient presents with    Blurred Vision     Started Friday, \" I first started to experience wobbly vision, and I am still experiencing the same type off vision disturbance. I have an appt with my eye doctor tomorrow.\"         Subjective:   HISTORY OF PRESENT ILLNESS: Kerry Champagne is a 43 y.o. female who presents for blurry and wavy vision for the last 2 days, she has had some mild nausea with vomiting as well.  She rpeorts a recent hx of complicated migraine and states that this is what she experienced last time and she had a full work up in the ED and was dx with migraine, given a migraine cocktail and did much better.   Patient denies headache, unilateral weakness, numbness, difficulty walking or with speech.  She has not had any head trauma.  She has no eye pain or vision loss or double vision.  She has an appointment with her eye doctor in the morning.  She also report her BP has been elevated but denies any chest pain or SOB    Medications, Allergies, current problem list, Social and Family history reviewed today in Epic.     Objective:     BP (!) 166/96 (BP Location: Left arm, Patient Position: Sitting, BP Cuff Size: Adult long)   Pulse 76   Temp 37.2 °C (98.9 °F) (Temporal)   Resp 16   Ht 1.626 m (5' 4\")   Wt 77.1 kg (170 lb)   SpO2 96%     Physical Exam  Vitals reviewed.   Constitutional:       General: She is not in acute distress.     Appearance: Normal appearance. She is not ill-appearing or toxic-appearing.   HENT:      Head: Normocephalic and atraumatic.      Right Ear: Tympanic membrane normal.      Left Ear: Tympanic membrane normal.      Mouth/Throat:      Mouth: Mucous membranes are moist.   Eyes:      Extraocular Movements: Extraocular movements intact.      Pupils: Pupils are equal, round, and reactive to light.   Cardiovascular:      Rate and Rhythm: Normal rate.      Pulses: Normal pulses.      Heart sounds: Normal heart sounds.   Pulmonary:      Effort: " Pulmonary effort is normal.      Breath sounds: Normal breath sounds.   Musculoskeletal:         General: Normal range of motion.      Cervical back: Normal range of motion.   Skin:     General: Skin is warm and dry.      Capillary Refill: Capillary refill takes less than 2 seconds.   Neurological:      General: No focal deficit present.      Mental Status: She is alert and oriented to person, place, and time.      Cranial Nerves: No cranial nerve deficit.      Sensory: No sensory deficit.      Motor: No weakness.      Gait: Gait normal.   Psychiatric:         Mood and Affect: Mood normal.         Behavior: Behavior normal.          Assessment/Plan:     Diagnosis and associated orders    I personally reviewed prior external notes and test results pertinent to today's visit.     1. Other migraine without status migrainosus, not intractable  Referral to Neurology            IMPRESSION:  Pt has stable vital signs and no red flag symptoms or exam findings identified. She has no neuro deficits. Her symptoms are very similar to her previous symptoms.  She is given an IM dose of toradol in the clinic today and advised to go to the ED if her symptoms do not resolve with this medication as she may need further medications.       Differential diagnosis discussed. Pt was Educated on red flag symptoms. Pt has been Instructed to return to Urgent Care or nearest Emergency Department if symptoms fail to improve, for any change in condition, further concerns, or new concerning symptoms. Patient states understanding of the plan of care and discharge instructions.  They are discharged in stable condition.         Please note that this dictation was created using voice recognition software. I have made a reasonable attempt to correct obvious errors, but I expect that there are errors of grammar and possibly content that I did not discover before finalizing the note.    This note was electronically signed by MICHAEL Wolf

## 2024-02-16 ENCOUNTER — OFFICE VISIT (OUTPATIENT)
Dept: MEDICAL GROUP | Facility: MEDICAL CENTER | Age: 44
End: 2024-02-16
Payer: COMMERCIAL

## 2024-02-16 VITALS
OXYGEN SATURATION: 97 % | BODY MASS INDEX: 31.24 KG/M2 | HEART RATE: 84 BPM | RESPIRATION RATE: 16 BRPM | SYSTOLIC BLOOD PRESSURE: 140 MMHG | HEIGHT: 64 IN | DIASTOLIC BLOOD PRESSURE: 82 MMHG | TEMPERATURE: 96.9 F | WEIGHT: 182.98 LBS

## 2024-02-16 DIAGNOSIS — E78.2 MIXED HYPERLIPIDEMIA: ICD-10-CM

## 2024-02-16 DIAGNOSIS — G43.109 MIGRAINE WITH AURA AND WITHOUT STATUS MIGRAINOSUS, NOT INTRACTABLE: ICD-10-CM

## 2024-02-16 DIAGNOSIS — R03.0 ELEVATED BLOOD PRESSURE READING: ICD-10-CM

## 2024-02-16 DIAGNOSIS — N92.6 IRREGULAR MENSTRUAL CYCLE: ICD-10-CM

## 2024-02-16 DIAGNOSIS — E66.9 OBESITY (BMI 30-39.9): ICD-10-CM

## 2024-02-16 DIAGNOSIS — Z13.1 SCREENING FOR DIABETES MELLITUS: ICD-10-CM

## 2024-02-16 PROCEDURE — 3077F SYST BP >= 140 MM HG: CPT | Performed by: FAMILY MEDICINE

## 2024-02-16 PROCEDURE — 99214 OFFICE O/P EST MOD 30 MIN: CPT | Performed by: FAMILY MEDICINE

## 2024-02-16 PROCEDURE — 3079F DIAST BP 80-89 MM HG: CPT | Performed by: FAMILY MEDICINE

## 2024-02-16 ASSESSMENT — PATIENT HEALTH QUESTIONNAIRE - PHQ9: CLINICAL INTERPRETATION OF PHQ2 SCORE: 0

## 2024-02-16 ASSESSMENT — ENCOUNTER SYMPTOMS
BLURRED VISION: 1
CHILLS: 0
FEVER: 0
PALPITATIONS: 0

## 2024-02-16 ASSESSMENT — FIBROSIS 4 INDEX: FIB4 SCORE: 0.7

## 2024-02-16 NOTE — PROGRESS NOTES
Verbal consent was acquired by the patient to use [employer] ambient listening note generation during this visit     Kerry was seen today for follow-up and requesting labs.    Diagnoses and all orders for this visit:    Mixed hyperlipidemia  -     Lipid Profile; Future    Migraine with aura and without status migrainosus, not intractable    Obesity (BMI 30-39.9)    Elevated blood pressure reading  -     Basic Metabolic Panel; Future    Screening for diabetes mellitus  -     HEMOGLOBIN A1C; Future    Irregular menstrual cycle  -     TSH+FREE T4  -     ESTRADIOL; Future  -     FSH; Future             Assessment & Plan  1. Elevated blood pressure.  New problem, unstable  Her blood pressure is exacerbating her complex migraine headaches. I recommend weight loss, reduced salt intake, drinking a lot of water, and exercise. I will give her some dietary instructions for lowering her blood pressure. She will continue to monitor her blood pressure at home for 1 week. She will bring her blood pressure log to me. I will start her on amlodipine 5 mg at bedtime. .    2. Missed menstrual cycle.  New problem, unstable  I will check her hormone levels, thyroid function test, estradiol, and FSH level.    3. High cholesterol.  Chronic problem, unstable  Her cholesterol was last checked in 09/2023. We will repeat her labs.    4. Migraine headaches.  Chronic problem, unstable  She will continue the rizatriptan medication as needed. If she starts having these problems, we can start her on a daily medication for migraine headaches.    5. Obesity.  Chronic problem unstable  Her BMI is at 31.41. We are going to work on diet modification.    6. Vision problems.  New problem, unstable, could be due to elevated blood pressure versus complex migraine headaches.  She will see the ophthalmologist.    Follow-up  The patient will follow up in 1 week for blood pressure          Chief complaint::Diagnoses of Mixed hyperlipidemia, Migraine with aura  and without status migrainosus, not intractable, Obesity (BMI 30-39.9), Elevated blood pressure reading, Screening for diabetes mellitus, and Irregular menstrual cycle were pertinent to this visit.        History of Present Illness  The patient is a 43-year-old female who is here for hospital follow-up.    Last Friday, she started getting some visual disturbance. She went out to a basketball game and ate normally and went to bed normal. She seemed a little off, but she was not driving. She woke up at 1:00 in the morning with nausea and vomiting. She laid low the rest of the weekend and slept and tried to rest as much as possible. She went to work normally on Monday, but she still noticed the visual disturbance, blurry and wavy. She could not see the screen as well. She generally did not feel good. She felt like she had a previous atypical migraine or it was her eyes. She made an eye appointment and went to the urgent care. She had very high blood pressure at that point. She was told to monitor for stroke symptoms and to take her migraine medicine. She took her migraine medicine and felt about 10 percent better. The next morning, she went home and slept for 18 hours. When she woke up, she was about 10 percent better, and then her  took her to the ER. She got a headache cocktail. Her blood pressure was high until they gave her ibuprofen IV. She finally relaxed. She went home again and rested. She worked from home the next day, but the visual disturbances were better. She has a follow-up appointment with the eye doctor on Friday. She has been monitoring her blood pressure this entire week now. She never got true headache pain. It was more an atypical migraine. She had some speech problems. She had high anxiety and panic. She was not given any medication for her blood pressure except ibuprofen in the ER. She feels about 50 percent better, but she still has the vision issue. She feels mentally and physically a  "little dizzy and nauseous, but she thinks it is her vision too. Her vision is better when her blood pressure goes down. She gets auras with her migraines. She takes rizatriptan as needed for migraine headaches. It helped some, but it did not. She did not max out her dose of her migraine medicine before going to the ER. Her migraines are usually 10 out of 10. She did not have pain with her other episode last year, but she almost had a panic attack.    She went 3 months without a period in 06/2023, 07/2023, and 08/2023. She has not had a period since mid 12/2023. They did 2 pregnancy tests in the ER and urgent care. She does not think she is pregnant, but would like to be tested.      Review of Systems   Constitutional:  Negative for chills, fever and malaise/fatigue.   Eyes:  Positive for blurred vision.   Cardiovascular:  Negative for chest pain, palpitations and leg swelling.          Medications and Allergies:     Current Outpatient Medications   Medication Sig Dispense Refill    acetaminophen (TYLENOL) 500 MG Tab Take 1,000 mg by mouth every 6 hours as needed. Indications: Pain      rizatriptan (MAXALT-MLT) 10 MG disintegrating tablet Take 1 tablet by mouth daily as needed for migraine headaches, may repeat dose in 2 hours. Maximum dose 30/24 hour (Patient taking differently: Take 10 mg by mouth one time as needed for Migraine. Take 1 tablet by mouth daily as needed for migraine headaches, may repeat dose in 2 hours. Maximum dose 30/24 hour) 30 Tablet 3     No current facility-administered medications for this visit.       BP (!) 140/82   Pulse 84   Temp 36.1 °C (96.9 °F)   Resp 16   Ht 1.626 m (5' 4\")   Wt 83 kg (182 lb 15.7 oz)   SpO2 97% , Body mass index is 31.41 kg/m².      Labs:  I reviewed with patient recent labs resulted on      Physical Exam  Constitutional:       General: She is not in acute distress.     Appearance: Normal appearance.   HENT:      Head: Normocephalic.      Nose: Nose normal. No " congestion.      Mouth/Throat:      Pharynx: No oropharyngeal exudate.   Cardiovascular:      Rate and Rhythm: Normal rate and regular rhythm.   Pulmonary:      Effort: Pulmonary effort is normal. No respiratory distress.      Breath sounds: Normal breath sounds. No stridor. No wheezing.   Skin:     Findings: No rash.   Neurological:      Mental Status: She is alert.   Psychiatric:         Mood and Affect: Mood normal.         Behavior: Behavior normal.            Results  Laboratory Studies  Labs were reviewed with the patient.    Imaging  CT scan of the head from 02/13/2024 was reviewed with the patient.    Testing  EKG was reviewed with the patient.          Please note that this dictation was created using voice recognition software. I have made every reasonable attempt to correct obvious errors, but I expect that there are errors of grammar and possibly content that I did not discover before finalizing the note.

## 2024-02-26 ENCOUNTER — HOSPITAL ENCOUNTER (OUTPATIENT)
Dept: LAB | Facility: MEDICAL CENTER | Age: 44
End: 2024-02-26
Attending: FAMILY MEDICINE
Payer: COMMERCIAL

## 2024-02-26 DIAGNOSIS — R03.0 ELEVATED BLOOD PRESSURE READING: ICD-10-CM

## 2024-02-26 DIAGNOSIS — E78.2 MIXED HYPERLIPIDEMIA: ICD-10-CM

## 2024-02-26 DIAGNOSIS — Z13.1 SCREENING FOR DIABETES MELLITUS: ICD-10-CM

## 2024-02-26 DIAGNOSIS — N92.6 IRREGULAR MENSTRUAL CYCLE: ICD-10-CM

## 2024-02-26 LAB
ANION GAP SERPL CALC-SCNC: 12 MMOL/L (ref 7–16)
BUN SERPL-MCNC: 12 MG/DL (ref 8–22)
CALCIUM SERPL-MCNC: 9.1 MG/DL (ref 8.5–10.5)
CHLORIDE SERPL-SCNC: 104 MMOL/L (ref 96–112)
CHOLEST SERPL-MCNC: 264 MG/DL (ref 100–199)
CO2 SERPL-SCNC: 25 MMOL/L (ref 20–33)
CREAT SERPL-MCNC: 0.92 MG/DL (ref 0.5–1.4)
EST. AVERAGE GLUCOSE BLD GHB EST-MCNC: 111 MG/DL
ESTRADIOL SERPL-MCNC: 15.1 PG/ML
FSH SERPL-ACNC: 86.5 MIU/ML
GFR SERPLBLD CREATININE-BSD FMLA CKD-EPI: 79 ML/MIN/1.73 M 2
GLUCOSE SERPL-MCNC: 95 MG/DL (ref 65–99)
HBA1C MFR BLD: 5.5 % (ref 4–5.6)
HDLC SERPL-MCNC: 52 MG/DL
LDLC SERPL CALC-MCNC: 191 MG/DL
POTASSIUM SERPL-SCNC: 3.8 MMOL/L (ref 3.6–5.5)
SODIUM SERPL-SCNC: 141 MMOL/L (ref 135–145)
T4 FREE SERPL-MCNC: 1.07 NG/DL (ref 0.93–1.7)
TRIGL SERPL-MCNC: 106 MG/DL (ref 0–149)
TSH SERPL DL<=0.005 MIU/L-ACNC: 1.17 UIU/ML (ref 0.38–5.33)

## 2024-02-26 PROCEDURE — 36415 COLL VENOUS BLD VENIPUNCTURE: CPT

## 2024-02-26 PROCEDURE — 83001 ASSAY OF GONADOTROPIN (FSH): CPT

## 2024-02-26 PROCEDURE — 80061 LIPID PANEL: CPT

## 2024-02-26 PROCEDURE — 83036 HEMOGLOBIN GLYCOSYLATED A1C: CPT

## 2024-02-26 PROCEDURE — 84439 ASSAY OF FREE THYROXINE: CPT

## 2024-02-26 PROCEDURE — 82670 ASSAY OF TOTAL ESTRADIOL: CPT

## 2024-02-26 PROCEDURE — 80048 BASIC METABOLIC PNL TOTAL CA: CPT

## 2024-02-26 PROCEDURE — 84443 ASSAY THYROID STIM HORMONE: CPT

## 2024-02-27 ENCOUNTER — OFFICE VISIT (OUTPATIENT)
Dept: MEDICAL GROUP | Facility: MEDICAL CENTER | Age: 44
End: 2024-02-27
Payer: COMMERCIAL

## 2024-02-27 VITALS
BODY MASS INDEX: 31.07 KG/M2 | DIASTOLIC BLOOD PRESSURE: 90 MMHG | WEIGHT: 182 LBS | HEART RATE: 84 BPM | OXYGEN SATURATION: 97 % | HEIGHT: 64 IN | RESPIRATION RATE: 16 BRPM | SYSTOLIC BLOOD PRESSURE: 162 MMHG | TEMPERATURE: 97.6 F

## 2024-02-27 DIAGNOSIS — N92.6 IRREGULAR MENSTRUAL BLEEDING: ICD-10-CM

## 2024-02-27 DIAGNOSIS — E78.2 MIXED HYPERLIPIDEMIA: ICD-10-CM

## 2024-02-27 DIAGNOSIS — I10 PRIMARY HYPERTENSION: ICD-10-CM

## 2024-02-27 PROCEDURE — 3077F SYST BP >= 140 MM HG: CPT | Performed by: FAMILY MEDICINE

## 2024-02-27 PROCEDURE — 99214 OFFICE O/P EST MOD 30 MIN: CPT | Performed by: FAMILY MEDICINE

## 2024-02-27 PROCEDURE — 3080F DIAST BP >= 90 MM HG: CPT | Performed by: FAMILY MEDICINE

## 2024-02-27 ASSESSMENT — ENCOUNTER SYMPTOMS
FEVER: 0
PALPITATIONS: 0
CHILLS: 0

## 2024-02-27 ASSESSMENT — FIBROSIS 4 INDEX: FIB4 SCORE: 0.7

## 2024-02-27 NOTE — PROGRESS NOTES
Verbal consent was acquired by the patient to use [employer] ambient listening note generation during this visit.      Kerry was seen today for hypertension follow-up.    Diagnoses and all orders for this visit:    Primary hypertension    Mixed hyperlipidemia  -     Lipid Profile; Future    Irregular menstrual bleeding  -     US-PELVIC COMPLETE (TRANSABDOMINAL/TRANSVAGINAL) (COMBO); Future                  Assessment & Plan  1. Primary hypertension.  This is a new condition, unstable. She took amlodipine 1 or 2 days  recently as she got sick last week. Blood pressure today is 162/90. I will recheck blood pressure to see if it is getting better. We will continue amlodipine 5 mg currently. If not getting better, which is less than 140/90, I recommend increasing amlodipine to 10 mg daily.    2. Mixed hyperlipidemia.  This is a chronic condition, unstable. She took compounded semaglutide which helped her to lose weight last year and she was off this medication as she got sick. She is going to restart working on diet and exercise for weight loss. I will recheck labs in 4 months. If numbers are not getting better, then we will start on Lipitor medication.    3. Irregular menstrual bleeding.  This is a new condition, unstable, going on since last year. We checked her FSH and estradiol level. Estradiol level is low and FSH is high, indicates towards perimenopause phase. I will check ultrasound pelvic area to check for uterine or ovarian abnormality.    Follow-up  The patient will follow up in 3 to 4 weeks for blood pressure.          Chief complaint::Diagnoses of Primary hypertension, Mixed hyperlipidemia, and Irregular menstrual bleeding were pertinent to this visit.      History of Present Illness  The patient is a 43-year-old female who is here for blood pressure follow-up today and migraine headache follow-up.    She was prescribed amlodipine 5 mg at her last visit. She took 1 pill and then got sick. Her blood  pressure at home is a little lower first thing in the morning.    Her headache and blurry vision were better when she got her eyes examined. She needs contacts again. She had LASIK and was good for 20 years.    She has not gotten her period yet. She missed her period for 3 months during the summer. She tracks it and was very consistent. The year previously, she went almost 60 days without a period. Last year, she did not have a period for 3 months. She only had 7 or 8 cycles in the last year. She denies any pelvic pain. She was sick the week before [place] and was getting night hot flashes, but she thought it was because she was sick. It did go away. She still gets hot flashes sometimes, but not during the day. She denies any chance of pregnancy.     She got sick before Thanksgiving. She was on semaglutide in 08/2023 to 11/2023 and was feeling better and lost weight. She thinks it is all weight related.   Her mother had early menopause. She has a family history of high cholesterol on her mother's side.      Review of Systems   Constitutional:  Negative for chills and fever.   Cardiovascular:  Negative for chest pain, palpitations and leg swelling.          Medications and Allergies:     Current Outpatient Medications   Medication Sig Dispense Refill    amLODIPine (NORVASC) 5 MG Tab Take 1 Tablet by mouth every day. 90 Tablet 3    acetaminophen (TYLENOL) 500 MG Tab Take 1,000 mg by mouth every 6 hours as needed. Indications: Pain      rizatriptan (MAXALT-MLT) 10 MG disintegrating tablet Take 1 tablet by mouth daily as needed for migraine headaches, may repeat dose in 2 hours. Maximum dose 30/24 hour (Patient taking differently: Take 10 mg by mouth one time as needed for Migraine. Take 1 tablet by mouth daily as needed for migraine headaches, may repeat dose in 2 hours. Maximum dose 30/24 hour) 30 Tablet 3     No current facility-administered medications for this visit.       BP (!) 162/90   Pulse 84   Temp 36.4  "°C (97.6 °F)   Resp 16   Ht 1.626 m (5' 4\")   Wt 82.6 kg (182 lb)   SpO2 97% , Body mass index is 31.24 kg/m².      Physical Exam  Constitutional:       Appearance: Normal appearance. She is well-developed and well-groomed.   HENT:      Head: Normocephalic and atraumatic.      Right Ear: External ear normal.      Left Ear: External ear normal.   Eyes:      General:         Right eye: No discharge.         Left eye: No discharge.      Conjunctiva/sclera: Conjunctivae normal.   Cardiovascular:      Rate and Rhythm: Normal rate.   Pulmonary:      Effort: Pulmonary effort is normal. No respiratory distress.   Musculoskeletal:      Cervical back: Neck supple.   Skin:     Findings: No rash.   Neurological:      Mental Status: She is alert.   Psychiatric:         Mood and Affect: Mood and affect normal.         Behavior: Behavior normal.              Results  Laboratory Studies  Labs 2/26/2024 were reviewed with the patient.          Please note that this dictation was created using voice recognition software. I have made every reasonable attempt to correct obvious errors, but I expect that there are errors of grammar and possibly content that I did not discover before finalizing the note.      "

## 2024-03-08 NOTE — PROGRESS NOTES
"[facility] NEUROLOGY  GENERAL NEUROLOGY  NEW PATIENT VISIT    Referral source: Justyn Crow MD    CC: Complicated migraine    HISTORY OF ILLNESS:  Kerry Champagne is a 44 y.o. woman with a history most notable for migraine. She stated that she was under an long duration of high stress and she was concerned with new auras associated with her migraine.  She was seen in February 12, 2024 then in the emergency room  the next day. In the emergency room it was noted her blood pressure was high. She was seen by her primary provider who started her on amlodipine.  Today, Kerry provided the following history:    Migraine description:  Starts with \"bubbles\" in upper aspect of her vision then she would get tunnel vision. She reports having about 6-8 hours before migraine pain would start. Migraine starts bilateral frontal region to the temples. Does not radiate. Quality of migraine \"head in a vice\", stabbing pain. Intensity of migraine without medication 10/10. Rizatriptan 1-2/10 . Migraine will last for approximately 4 hours to 6 hours. Post migraine hangover of brain fog and fatigue 2-3 days. Frequency of migraines once or twice a year. Associated symptoms: light sensitivity, noise sensitivity, temperature sensitivity, blurred vision, nausea, vomiting, subjective left arm numbness, difficulty with speech, facial recognition visual disturbance. Triggers: stress, hormones, barometric pressure changes, altitude changes, dehydration. Can trigger with crying. Denies being able to trigger with sudden position changes, bending, coughing, sneezing, or laughing. Denies double vision. Migraines usually occur in the afternoon.    She reports during the migraine that she had in February that lasted a few days; she had no pain from migraine.  She reports only having change in her auras with no migraine pain.  She states that she was worried because she had left arm numbness, visual disturbance, difficulty with speech, she also " stated she had visual issues with looking at faces.    The following is a summary of headache symptoms, presented in my standard format:    Family History: daughter  Age at onset (years): 8-9 years old  Location: see above   Radiation: see above   Frequency: see above   Duration: see above   Headache Days/Month: January 0/30, February 5/29, March 0/11  Quality: see above   Intensity: see above   Aura: see above   Photophobia/Phonophobia/Nausea/Vomiting: yes, yes, yes, yes  Provoked by Physical Activity?: no  Triggers: see above   Associated Symptoms: see above   Autonomic Signs (such as ptosis, miosis, conjunctival injection, rhinorrhea, increased lacrimation): one sided nasal drainage  Head Trauma: no  Association with Menses: yes  ED Visits: yes  Hospitalizations: none  Missed Work Days ([employer]): 1 week in February  Sleep (hours/night): 8 hours  Caffeine Intake: 1 shot of espresso every other day  Hydration: yes  Nutrition: intermittent fasting  Exercise: yes  Analgesic Overuse: Tylenol 1-2 tablets infrequently     Current Medication Regimen:  - amlodipine  - Rizatriptan    Medications Tried: Response  Preventive:  -     Rescue:  - Sumatriptan     Medications Not Tried:  -     MEDICAL AND SURGICAL HISTORY:  Past Medical History:   Diagnosis Date    Family planning, IUD (intrauterine device) in place 4/27/2015    Migraine     Migraine with aura and without status migrainosus, not intractable      Past Surgical History:   Procedure Laterality Date    HAMMERTOE CORRECTION Left 5/22/2015    Procedure: HAMMERTOE CORRECTION: WITH PERCUTANEOUS FIXATION 4TH;  Surgeon: John Lorenzo D.P.M.;  Location: SURGERY SAME DAY [facility];  Service:     DILATION AND EVACUATION  11/2/2010    Performed by ANAIH RICHEY at SURGERY [facility]    HAMMERTOE CORRECTION  2002    OTHER ORTHOPEDIC SURGERY  1999    foot surgery     MEDICATIONS:  Current Outpatient Medications   Medication Sig    amLODIPine (NORVASC) 5 MG Tab Take  "1 Tablet by mouth every day.    acetaminophen (TYLENOL) 500 MG Tab Take 1,000 mg by mouth every 6 hours as needed. Indications: Pain    rizatriptan (MAXALT-MLT) 10 MG disintegrating tablet Take 1 tablet by mouth daily as needed for migraine headaches, may repeat dose in 2 hours. Maximum dose 30/24 hour (Patient taking differently: Take 10 mg by mouth one time as needed for Migraine. Take 1 tablet by mouth daily as needed for migraine headaches, may repeat dose in 2 hours. Maximum dose 30/24 hour)     SOCIAL HISTORY:  Social History     Tobacco Use    Smoking status: Never    Smokeless tobacco: Never   Substance Use Topics    Alcohol use: Yes     Social History     Social History Narrative    2015: working at [employer]     FAMILY HISTORY:  Family History   Problem Relation Age of Onset    Hypertension Mother     Hypertension Maternal Grandmother     Stroke Maternal Grandfather        Ambulatory Vitals  Encounter Vitals  Temperature: 36.9 °C (98.5 °F)  Temp src: Temporal  Blood Pressure: 110/82  Pulse: 75  Respiration: 16  Pulse Oximetry: 98 %  Weight: 84.2 kg (185 lb 10 oz)  Height: 162.6 cm (5' 4\")  BMI (Calculated): 31.86     REVIEW OF SYSTEMS:  A ROS was completed.  Pertinent positives and negatives were included in the HPI, above.  All other systems were reviewed and are negative.    PHYSICAL EXAM:  General/Medical:  Kerry presents clean and well-dressed.  She does not appear in any acute distress at this time.  She has no malar rash.  She has good range of motion of her neck.  She reports no jaw claudication or jaw pain.  She reports no allodynia.  She has no upper or lower extremity edema.  She has palpable radial pulses.  She has good capillary refill in the upper extremities.  Auscultation of her heart revealed S1 and S2 with no abnormal sounds.  Vital signs are listed above and are within normal limits.    Neuro:  MENTAL STATUS: awake and alert; no deficits of speech or language; oriented to person, place, and " "time; affect was appropriate to situation    CRANIAL NERVES:    II: acuity: J1/J3, fields: intact to confrontation, pupils: 3/3 to 2/2 without a relative afferent pupillary defect, discs: Right disc unable to visualize, left disc sharp    III/IV/VI: versions: intact without nystagmus    V: facial sensation: symmetric to light touch    VII: facial expression: symmetric    VIII: hearing: intact to finger rub    IX/X: palate: elevates symmetrically    XI: shoulder shrug: symmetric    XII: tongue: midline    MOTOR:  - bulk: normal throughout  - tone: normal throughout  Upper Extremity Strength  (R/L)    5/5   Elbow flexion 5/5   Elbow extension 5/5   Shoulder abduction 5/5     Lower Extremity Strength  (R/L)   Hip flexion 5/5   Knee extension 5/5   Knee flexion 5/5   Ankle plantarflexion 5/5   Ankle dorsiflexion 5/5     - pronator drift: absent  - abnormal movements: none    SENSATION:  - light touch: Intact  - vibration: Intact  - temperature: Intact  - pinprick: NT  - proprioception: NT  - Romberg: absent    COORDINATION:  - finger to nose: normal, no ataxia on exam  - finger tapping: rapid and accurate, bilaterally  - foot tapping: Rapid and accurate, bilaterally    REFLEXES:  Reflex Right Left   BR 2+ 2+   Biceps 2+ 2+   Triceps 2+ 2+   Patellae 2+ 2+   Achilles 2+ 2+   Toes NT NT     GAIT:  - narrow base and normal, with normal arm swing  - heel-walk: Intact  - toe-walk: Intact  - tandem gait: intact    REVIEW OF IMAGING STUDIES: I reviewed the following studies:  CT HEAD:  Date: 2/13/24  W/o and w/ contrast?: without  Indication: \"altered vision\"  Comparison: none  Impression:  No evidence of acute intracranial process.     REVIEW OF LABORATORY STUDIES:  2/26/24 BMP WNL  2/26/24 TSH WNL    ASSESSMENT& PLAN:  1. Migraine with aura and with status migrainosus, not intractable  Kerry presents to establish with a neurology provider for ongoing management of her migraines.  She reports having migraines at least " twice a year and was previously seen by Dr. Florez.  Her previous appointment with Dr. Florez he put her on rizatriptan and suggested possibly getting occipital nerve blocks when she was having 10 out of 10 migraine pain.  In February 2024, Kerry had an odd migraine experience.  She had only auras with new auras including numbness of her face on the left arm but no migraine pain.  She reports 5 days of migraines for the month of February but 0 for January and March.  Her neurological exam revealed no concerning findings.  She has not had a brain MRI and with her experience in February she would like to obtain a brain MRI just to make sure there are no secondary causes for her symptoms.  Since Maxalt is working effectively at aborting her migraines I will refill that for a year.  She does report having nausea and vomiting associated with her migraines so I will give her Zofran to take with the rizatriptan at onset of migraine.  We discussed lifestyle changes such as: Adequate sleep, stress reduction, staying hydrated, not skipping meals, not overusing over-the-counter analgesics.  We also discussed over-the-counter supplements for migraine prevention listed below.  Since Kerry's migraines are so infrequent that she will follow-up with me after her next migraine to discuss migraine and current medication regimen.  After she gets the MRI of her head I will reach out via ZEFR if the results are normal otherwise I will have her come in for an appointment to discuss. Kerry can reach me via ZEFR with any updates, concerns, or questions.  Otherwise I will see her after her next migraine which could potentially be in a year.    - rizatriptan (MAXALT-MLT) 10 MG disintegrating tablet; Take 10 mg at the onset of aura/HA; may re-dose x1 after 2 hrs if HA persists; MDD: 20 mg; do not use >2 days/week.  Dispense: 11 Tablet; Refill: 11  - ondansetron (ZOFRAN ODT) 4 MG TABLET DISPERSIBLE; Take 1 Tablet by mouth every 6 hours as  needed (migraine associated nausea and vomiting).  Dispense: 30 Tablet; Refill: 2  - MR-BRAIN-W/O; Future   Try supplementing:  - magnesium: 400-600 mg once or 200-300 mg twice daily  - riboflavin (vitamin B2): 400 mg once daily  - coenzyme Q10: 300 mg daily     BILLING DOCUMENTATION:   I spent 80 minutes in patient care. This includes time with chart review, pre-charting, records review, discussions with other healthcare providers, and documentation. This also includes face-to-face time with the patient for: exam review, discussion and treatment planning.     Sita García MSN APRN-CNP  [facility] Neurology [place]

## 2024-03-11 ENCOUNTER — TELEPHONE (OUTPATIENT)
Dept: NEUROLOGY | Facility: MEDICAL CENTER | Age: 44
End: 2024-03-11

## 2024-03-11 ENCOUNTER — OFFICE VISIT (OUTPATIENT)
Dept: NEUROLOGY | Facility: MEDICAL CENTER | Age: 44
End: 2024-03-11
Payer: COMMERCIAL

## 2024-03-11 VITALS
RESPIRATION RATE: 16 BRPM | OXYGEN SATURATION: 98 % | HEIGHT: 64 IN | DIASTOLIC BLOOD PRESSURE: 82 MMHG | TEMPERATURE: 98.5 F | WEIGHT: 185.63 LBS | SYSTOLIC BLOOD PRESSURE: 110 MMHG | BODY MASS INDEX: 31.69 KG/M2 | HEART RATE: 75 BPM

## 2024-03-11 DIAGNOSIS — G43.101 MIGRAINE WITH AURA AND WITH STATUS MIGRAINOSUS, NOT INTRACTABLE: ICD-10-CM

## 2024-03-11 PROCEDURE — 99211 OFF/OP EST MAY X REQ PHY/QHP: CPT

## 2024-03-11 PROCEDURE — 99205 OFFICE O/P NEW HI 60 MIN: CPT

## 2024-03-11 PROCEDURE — 99417 PROLNG OP E/M EACH 15 MIN: CPT

## 2024-03-11 RX ORDER — ONDANSETRON 4 MG/1
4 TABLET, ORALLY DISINTEGRATING ORAL EVERY 6 HOURS PRN
Qty: 30 TABLET | Refills: 2 | Status: SHIPPED | OUTPATIENT
Start: 2024-03-11

## 2024-03-11 RX ORDER — RIZATRIPTAN BENZOATE 10 MG/1
TABLET, ORALLY DISINTEGRATING ORAL
Qty: 11 TABLET | Refills: 11 | Status: SHIPPED | OUTPATIENT
Start: 2024-03-11

## 2024-03-11 ASSESSMENT — FIBROSIS 4 INDEX: FIB4 SCORE: 0.72

## 2024-03-11 NOTE — TELEPHONE ENCOUNTER
Received [employer] request via [employer]  for rizatriptan (MAXALT-MLT) 10 MG disintegrating tablet. (Quantity:11, Day Supply:30)     Insurance: [email or web address]  Member ID:  [id number]  BIN: [id number]  PCN: [id number]  Group: [id number]     Ran Test claim via [employer] medication Pays for a $10.00 copay. Will outreach to patient to offer specialty pharmacy services and or release to preferred pharmacy

## 2024-03-11 NOTE — PATIENT INSTRUCTIONS
Try supplementing:  - magnesium: 400 mg   - riboflavin (vitamin B2): 400 mg once daily  - coenzyme Q10: 300 mg daily       At onset of migraine pain take one rizatriptan, one zofran, and two aleve. If in 1-2 hours take another rizatriptan. If migraine becomes bad you can add one- two benadryl to the cocktail    [phone] radiology

## 2024-03-12 ENCOUNTER — TELEPHONE (OUTPATIENT)
Dept: NEUROLOGY | Facility: MEDICAL CENTER | Age: 44
End: 2024-03-12
Payer: COMMERCIAL

## 2024-03-12 NOTE — TELEPHONE ENCOUNTER
Attempted to contact patient at [phone] to discuss [facility] and services/benefits offered. No answer, left voicemail.      Chelsy Adams

## 2024-03-18 ENCOUNTER — HOSPITAL ENCOUNTER (OUTPATIENT)
Dept: RADIOLOGY | Facility: MEDICAL CENTER | Age: 44
End: 2024-03-18
Attending: FAMILY MEDICINE
Payer: COMMERCIAL

## 2024-03-18 DIAGNOSIS — N92.6 IRREGULAR MENSTRUAL BLEEDING: ICD-10-CM

## 2024-03-18 PROCEDURE — 76830 TRANSVAGINAL US NON-OB: CPT

## 2024-03-29 ENCOUNTER — APPOINTMENT (OUTPATIENT)
Dept: MEDICAL GROUP | Facility: MEDICAL CENTER | Age: 44
End: 2024-03-29
Payer: COMMERCIAL

## 2024-04-03 ENCOUNTER — APPOINTMENT (OUTPATIENT)
Dept: MEDICAL GROUP | Facility: MEDICAL CENTER | Age: 44
End: 2024-04-03
Payer: COMMERCIAL

## 2024-04-03 VITALS
TEMPERATURE: 97.3 F | OXYGEN SATURATION: 97 % | DIASTOLIC BLOOD PRESSURE: 70 MMHG | RESPIRATION RATE: 16 BRPM | BODY MASS INDEX: 32.37 KG/M2 | SYSTOLIC BLOOD PRESSURE: 132 MMHG | HEIGHT: 64 IN | WEIGHT: 189.6 LBS | HEART RATE: 73 BPM

## 2024-04-03 DIAGNOSIS — N92.6 IRREGULAR MENSTRUAL BLEEDING: ICD-10-CM

## 2024-04-03 DIAGNOSIS — I10 PRIMARY HYPERTENSION: ICD-10-CM

## 2024-04-03 DIAGNOSIS — E78.2 MIXED HYPERLIPIDEMIA: ICD-10-CM

## 2024-04-03 PROCEDURE — 3075F SYST BP GE 130 - 139MM HG: CPT | Performed by: FAMILY MEDICINE

## 2024-04-03 PROCEDURE — 3078F DIAST BP <80 MM HG: CPT | Performed by: FAMILY MEDICINE

## 2024-04-03 PROCEDURE — 99214 OFFICE O/P EST MOD 30 MIN: CPT | Performed by: FAMILY MEDICINE

## 2024-04-03 ASSESSMENT — ENCOUNTER SYMPTOMS
FEVER: 0
CHILLS: 0
PALPITATIONS: 0

## 2024-04-03 ASSESSMENT — FIBROSIS 4 INDEX: FIB4 SCORE: 0.72

## 2024-04-03 NOTE — PROGRESS NOTES
Verbal consent was acquired by the patient to use [employer] ambient listening note generation during this visit.      Kerry was seen today for hypertension follow-up.    Diagnoses and all orders for this visit:    Primary hypertension    Irregular menstrual bleeding  -     Referral to Gynecology    Mixed hyperlipidemia                  Assessment & Plan  1. Chronic Hypertension  The patient's condition is currently stable, as evidenced by today's blood pressure reading of 132/70. The patient's home blood pressure readings have consistently been in the lower range. The patient will maintain her current regimen of amlodipine 5 mg daily.    2. Chronic Menstrual Cycle  The patient's condition is stable. She has not had a menstrual cycle since 12/2023. An ultrasound of the pelvis revealed endometrial thickness of approximately 10 mm, with the left ovary not visualized. There was no ovarian cyst or adnexal mass. We discussed various birth control options. Given her recent diagnosis of migraine with aura symptoms, I do not recommend an estrogen component in birth control. Therefore, I will refer her to a gynecologist for a consultation for an IUD.    3. Mixed Hyperlipidemia  The patient's Chronic condition is stable. The patient will focus on diet and exercise for the next 6 months, in addition to weight loss, which will help in improving her cholesterol levels. We will recheck her labs in 6 months. If her numbers do not improve, we will conduct a CT cardiac scoring to further evaluate her condition.    Follow-up  The patient is scheduled for a follow-up visit in 6 months for a lab follow-up.          Chief complaint::Diagnoses of Primary hypertension, Irregular menstrual bleeding, and Mixed hyperlipidemia were pertinent to this visit.      History of Present Illness  The patient is a 44-year-old female who is here for blood pressure follow-up today.    The patient has been consistently monitoring her blood pressure at  "home, with systolic readings typically in the 120s. She is currently on a regimen of amlodipine 5 mg.    The patient has a chronic history of headaches and migraines, which typically manifest around her menstrual cycle. . She has an MRI of her brain scheduled for the upcoming Monday. Her neurologist has prescribed a migraine cocktail, Maxalt, Zofran, and magnesium, in an effort to prevent emergency room visits.    The patient has expressed interest in hormone replacement therapy, given her low estrogen levels. She reports minimal symptoms. She has previously had an intrauterine device (IUD). She has not had a menstrual cycle since 12/2023, and prior to that, her menstrual cycle was consistent. She does not have a gynecologist.    The patient has observed significant hair thinning and is contemplating the use of oral medication. She maintains a high-protein diet.        Review of Systems   Constitutional:  Negative for chills and fever.   Cardiovascular:  Negative for chest pain, palpitations and leg swelling.          Medications and Allergies:     Current Outpatient Medications   Medication Sig Dispense Refill    rizatriptan (MAXALT-MLT) 10 MG disintegrating tablet Take 10 mg at the onset of aura/HA; may re-dose x1 after 2 hrs if HA persists; MDD: 20 mg; do not use >2 days/week. 11 Tablet 11    ondansetron (ZOFRAN ODT) 4 MG TABLET DISPERSIBLE Take 1 Tablet by mouth every 6 hours as needed (migraine associated nausea and vomiting). 30 Tablet 2    amLODIPine (NORVASC) 5 MG Tab Take 1 Tablet by mouth every day. 90 Tablet 3    acetaminophen (TYLENOL) 500 MG Tab Take 1,000 mg by mouth every 6 hours as needed. Indications: Pain       No current facility-administered medications for this visit.       /70   Pulse 73   Temp 36.3 °C (97.3 °F)   Resp 16   Ht 1.626 m (5' 4\")   Wt 86 kg (189 lb 9.5 oz)   SpO2 97% , Body mass index is 32.54 kg/m².      Physical Exam  Constitutional:       Appearance: Normal " appearance. She is well-developed and well-groomed.   HENT:      Head: Normocephalic and atraumatic.      Right Ear: External ear normal.      Left Ear: External ear normal.   Eyes:      General:         Right eye: No discharge.         Left eye: No discharge.      Conjunctiva/sclera: Conjunctivae normal.   Cardiovascular:      Rate and Rhythm: Normal rate.   Pulmonary:      Effort: Pulmonary effort is normal. No respiratory distress.   Musculoskeletal:      Cervical back: Neck supple.   Skin:     Findings: No rash.   Neurological:      Mental Status: She is alert.   Psychiatric:         Mood and Affect: Mood and affect normal.         Behavior: Behavior normal.              Results    Imaging from 03/18/2024  Pelvic ultrasound showed endometrial thickness about 10 mm. Left ovary was not visualized. No ovarian cyst or adnexal mass was observed.          Please note that this dictation was created using voice recognition software. I have made every reasonable attempt to correct obvious errors, but I expect that there are errors of grammar and possibly content that I did not discover before finalizing the note.

## 2024-04-08 ENCOUNTER — APPOINTMENT (OUTPATIENT)
Dept: RADIOLOGY | Facility: MEDICAL CENTER | Age: 44
End: 2024-04-08
Payer: COMMERCIAL

## 2024-04-08 DIAGNOSIS — G43.101 MIGRAINE WITH AURA AND WITH STATUS MIGRAINOSUS, NOT INTRACTABLE: ICD-10-CM

## 2024-04-08 PROCEDURE — 70551 MRI BRAIN STEM W/O DYE: CPT

## 2024-04-29 ENCOUNTER — APPOINTMENT (OUTPATIENT)
Dept: RADIOLOGY | Facility: MEDICAL CENTER | Age: 44
End: 2024-04-29
Attending: FAMILY MEDICINE
Payer: COMMERCIAL

## 2024-04-29 DIAGNOSIS — Z12.31 VISIT FOR SCREENING MAMMOGRAM: ICD-10-CM

## 2024-04-29 PROCEDURE — 77067 SCR MAMMO BI INCL CAD: CPT

## 2024-10-09 ENCOUNTER — HOSPITAL ENCOUNTER (OUTPATIENT)
Dept: LAB | Facility: MEDICAL CENTER | Age: 44
End: 2024-10-09
Attending: NURSE PRACTITIONER
Payer: COMMERCIAL

## 2024-10-09 DIAGNOSIS — E78.2 MIXED HYPERLIPIDEMIA: ICD-10-CM

## 2024-10-09 LAB
CHOLEST SERPL-MCNC: 188 MG/DL (ref 100–199)
HDLC SERPL-MCNC: 46 MG/DL
LDLC SERPL CALC-MCNC: 126 MG/DL
TRIGL SERPL-MCNC: 79 MG/DL (ref 0–149)

## 2024-10-09 PROCEDURE — 80053 COMPREHEN METABOLIC PANEL: CPT

## 2024-10-09 PROCEDURE — 80061 LIPID PANEL: CPT

## 2024-10-09 PROCEDURE — 36415 COLL VENOUS BLD VENIPUNCTURE: CPT

## 2024-10-10 LAB
ALBUMIN SERPL BCP-MCNC: 4.2 G/DL (ref 3.2–4.9)
ALBUMIN/GLOB SERPL: 1.8 G/DL
ALP SERPL-CCNC: 60 U/L (ref 30–99)
ALT SERPL-CCNC: 8 U/L (ref 2–50)
ANION GAP SERPL CALC-SCNC: 11 MMOL/L (ref 7–16)
AST SERPL-CCNC: 16 U/L (ref 12–45)
BILIRUB SERPL-MCNC: 0.4 MG/DL (ref 0.1–1.5)
BUN SERPL-MCNC: 7 MG/DL (ref 8–22)
CALCIUM ALBUM COR SERPL-MCNC: 8.6 MG/DL (ref 8.5–10.5)
CALCIUM SERPL-MCNC: 8.8 MG/DL (ref 8.5–10.5)
CHLORIDE SERPL-SCNC: 102 MMOL/L (ref 96–112)
CO2 SERPL-SCNC: 22 MMOL/L (ref 20–33)
CREAT SERPL-MCNC: 0.79 MG/DL (ref 0.5–1.4)
GFR SERPLBLD CREATININE-BSD FMLA CKD-EPI: 95 ML/MIN/1.73 M 2
GLOBULIN SER CALC-MCNC: 2.4 G/DL (ref 1.9–3.5)
GLUCOSE SERPL-MCNC: 89 MG/DL (ref 65–99)
POTASSIUM SERPL-SCNC: 4 MMOL/L (ref 3.6–5.5)
PROT SERPL-MCNC: 6.6 G/DL (ref 6–8.2)
SODIUM SERPL-SCNC: 135 MMOL/L (ref 135–145)

## 2024-10-17 ENCOUNTER — OFFICE VISIT (OUTPATIENT)
Dept: MEDICAL GROUP | Facility: MEDICAL CENTER | Age: 44
End: 2024-10-17
Payer: COMMERCIAL

## 2024-10-17 VITALS
HEIGHT: 64 IN | BODY MASS INDEX: 27.44 KG/M2 | DIASTOLIC BLOOD PRESSURE: 78 MMHG | SYSTOLIC BLOOD PRESSURE: 118 MMHG | TEMPERATURE: 97.9 F | OXYGEN SATURATION: 95 % | HEART RATE: 76 BPM | WEIGHT: 160.72 LBS

## 2024-10-17 DIAGNOSIS — Z12.31 ENCOUNTER FOR SCREENING MAMMOGRAM FOR MALIGNANT NEOPLASM OF BREAST: ICD-10-CM

## 2024-10-17 DIAGNOSIS — E78.2 MIXED HYPERLIPIDEMIA: ICD-10-CM

## 2024-10-17 DIAGNOSIS — Z23 NEED FOR VACCINATION: ICD-10-CM

## 2024-10-17 DIAGNOSIS — E55.9 VITAMIN D INSUFFICIENCY: ICD-10-CM

## 2024-10-17 DIAGNOSIS — I10 PRIMARY HYPERTENSION: ICD-10-CM

## 2024-10-17 PROBLEM — E66.9 OBESITY (BMI 30-39.9): Status: RESOLVED | Noted: 2024-02-16 | Resolved: 2024-10-17

## 2024-10-17 PROCEDURE — 3078F DIAST BP <80 MM HG: CPT | Performed by: FAMILY MEDICINE

## 2024-10-17 PROCEDURE — 3074F SYST BP LT 130 MM HG: CPT | Performed by: FAMILY MEDICINE

## 2024-10-17 PROCEDURE — 99214 OFFICE O/P EST MOD 30 MIN: CPT | Mod: 25 | Performed by: FAMILY MEDICINE

## 2024-10-17 PROCEDURE — 90471 IMMUNIZATION ADMIN: CPT | Performed by: FAMILY MEDICINE

## 2024-10-17 PROCEDURE — 90656 IIV3 VACC NO PRSV 0.5 ML IM: CPT | Performed by: FAMILY MEDICINE

## 2024-10-17 ASSESSMENT — ENCOUNTER SYMPTOMS
CHILLS: 0
PALPITATIONS: 0
FEVER: 0

## 2024-10-17 ASSESSMENT — FIBROSIS 4 INDEX: FIB4 SCORE: 0.98

## 2024-10-21 ENCOUNTER — OFFICE VISIT (OUTPATIENT)
Dept: URGENT CARE | Facility: CLINIC | Age: 44
End: 2024-10-21
Payer: COMMERCIAL

## 2024-10-21 VITALS
DIASTOLIC BLOOD PRESSURE: 88 MMHG | HEART RATE: 74 BPM | WEIGHT: 158 LBS | RESPIRATION RATE: 14 BRPM | TEMPERATURE: 97.4 F | OXYGEN SATURATION: 98 % | SYSTOLIC BLOOD PRESSURE: 134 MMHG | BODY MASS INDEX: 26.98 KG/M2 | HEIGHT: 64 IN

## 2024-10-21 DIAGNOSIS — J32.9 SINUSITIS, UNSPECIFIED CHRONICITY, UNSPECIFIED LOCATION: ICD-10-CM

## 2024-10-21 DIAGNOSIS — J34.89 SINUS PRESSURE: ICD-10-CM

## 2024-10-21 PROCEDURE — 3079F DIAST BP 80-89 MM HG: CPT | Performed by: FAMILY MEDICINE

## 2024-10-21 PROCEDURE — 99213 OFFICE O/P EST LOW 20 MIN: CPT | Performed by: FAMILY MEDICINE

## 2024-10-21 PROCEDURE — 3075F SYST BP GE 130 - 139MM HG: CPT | Performed by: FAMILY MEDICINE

## 2024-10-21 RX ORDER — FLUTICASONE PROPIONATE 50 MCG
2 SPRAY, SUSPENSION (ML) NASAL DAILY
Qty: 1 G | Refills: 1 | Status: SHIPPED | OUTPATIENT
Start: 2024-10-21

## 2024-10-21 ASSESSMENT — ENCOUNTER SYMPTOMS
DIZZINESS: 0
SINUS PRESSURE: 0
SINUS PAIN: 1
VOMITING: 0
MYALGIAS: 0
FEVER: 0
NAUSEA: 0
COUGH: 0
SHORTNESS OF BREATH: 0
CHILLS: 0
SORE THROAT: 0

## 2024-10-21 ASSESSMENT — FIBROSIS 4 INDEX: FIB4 SCORE: 0.98

## 2025-05-01 ENCOUNTER — PATIENT MESSAGE (OUTPATIENT)
Dept: MEDICAL GROUP | Facility: MEDICAL CENTER | Age: 45
End: 2025-05-01
Payer: COMMERCIAL

## 2025-05-01 DIAGNOSIS — H53.139 SUDDEN VISUAL LOSS, UNSPECIFIED LATERALITY: ICD-10-CM

## 2025-05-05 NOTE — Clinical Note
REFERRAL APPROVAL NOTICE         Sent on May 5, 2025                   Kerry Champagne  [address]  [place] [address]                   Dear Ms. Champagne,    After a careful review of the medical information and benefit coverage, [facility] has processed your referral. See below for additional details.    If applicable, you must be actively enrolled with your insurance for coverage of the authorized service. If you have any questions regarding your coverage, please contact your insurance directly.    REFERRAL INFORMATION   Referral #:  [id number]  Referred-To Provider    Referred-By Provider:  Ophthalmology    Darryl Padilla M.D.    [facility]      [address]  [address]  [facility] [address]  [phone] [id number]  [place] [address]  [phone]    Referral Start Date:  05/02/2025  Referral End Date:   05/01/2026             SCHEDULING  If you do not already have an appointment, please call [phone] to make an appointment.     MORE INFORMATION  If you do not already have a [employer] account, sign up at: [email or web address]  You can access your medical information, make appointments, see lab results, billing information, and more.  If you have questions regarding this referral, please contact  the [facility] Referrals department at:             [phone]. Monday - Friday 8:00AM - 5:00PM.     Sincerely,    [facility]

## 2025-05-08 RX ORDER — AMLODIPINE BESYLATE 5 MG/1
5 TABLET ORAL DAILY
Qty: 90 TABLET | Refills: 1 | Status: SHIPPED | OUTPATIENT
Start: 2025-05-08

## 2025-05-08 NOTE — TELEPHONE ENCOUNTER
Received request via: Patient    Was the patient seen in the last year in this department? Yes    Does the patient have an active prescription (recently filled or refills available) for medication(s) requested? No    Pharmacy Name: [facility]    Does the patient have [facility]PC Plus and need 100-day supply? (This applies to ALL medications) Patient does not have SCP

## 2025-07-23 ENCOUNTER — HOSPITAL ENCOUNTER (OUTPATIENT)
Facility: MEDICAL CENTER | Age: 45
End: 2025-07-23
Attending: FAMILY MEDICINE
Payer: COMMERCIAL

## 2025-07-23 DIAGNOSIS — Z12.31 VISIT FOR SCREENING MAMMOGRAM: ICD-10-CM

## 2025-07-23 PROCEDURE — 77063 BREAST TOMOSYNTHESIS BI: CPT

## 2025-08-05 ENCOUNTER — OFFICE VISIT (OUTPATIENT)
Dept: MEDICAL GROUP | Facility: MEDICAL CENTER | Age: 45
End: 2025-08-05
Payer: COMMERCIAL

## 2025-08-05 VITALS
SYSTOLIC BLOOD PRESSURE: 166 MMHG | HEIGHT: 64 IN | BODY MASS INDEX: 26.61 KG/M2 | DIASTOLIC BLOOD PRESSURE: 102 MMHG | WEIGHT: 155.87 LBS | HEART RATE: 77 BPM | OXYGEN SATURATION: 97 % | TEMPERATURE: 99.2 F

## 2025-08-05 DIAGNOSIS — I10 PRIMARY HYPERTENSION: ICD-10-CM

## 2025-08-05 DIAGNOSIS — N95.1 PERIMENOPAUSAL: Primary | ICD-10-CM

## 2025-08-05 PROCEDURE — 3080F DIAST BP >= 90 MM HG: CPT | Performed by: FAMILY MEDICINE

## 2025-08-05 PROCEDURE — 99214 OFFICE O/P EST MOD 30 MIN: CPT | Performed by: FAMILY MEDICINE

## 2025-08-05 PROCEDURE — 3077F SYST BP >= 140 MM HG: CPT | Performed by: FAMILY MEDICINE

## 2025-08-05 RX ORDER — LISINOPRIL AND HYDROCHLOROTHIAZIDE 10; 12.5 MG/1; MG/1
1 TABLET ORAL DAILY
Qty: 30 TABLET | Refills: 1 | Status: SHIPPED | OUTPATIENT
Start: 2025-08-05

## 2025-08-05 ASSESSMENT — ENCOUNTER SYMPTOMS
CHILLS: 0
FEVER: 0

## 2025-08-05 ASSESSMENT — FIBROSIS 4 INDEX: FIB4 SCORE: 1

## 2025-08-05 ASSESSMENT — PATIENT HEALTH QUESTIONNAIRE - PHQ9: CLINICAL INTERPRETATION OF PHQ2 SCORE: 0

## 2025-08-06 ENCOUNTER — HOSPITAL ENCOUNTER (OUTPATIENT)
Dept: LAB | Facility: MEDICAL CENTER | Age: 45
End: 2025-08-06
Attending: FAMILY MEDICINE
Payer: COMMERCIAL

## 2025-08-06 DIAGNOSIS — I10 PRIMARY HYPERTENSION: ICD-10-CM

## 2025-08-06 DIAGNOSIS — E55.9 VITAMIN D INSUFFICIENCY: ICD-10-CM

## 2025-08-06 DIAGNOSIS — E78.2 MIXED HYPERLIPIDEMIA: ICD-10-CM

## 2025-08-06 DIAGNOSIS — N95.1 PERIMENOPAUSAL: ICD-10-CM

## 2025-08-06 LAB
25(OH)D3 SERPL-MCNC: 35 NG/ML (ref 30–100)
ALBUMIN SERPL BCP-MCNC: 4.1 G/DL (ref 3.2–4.9)
ALBUMIN/GLOB SERPL: 1.6 G/DL
ALP SERPL-CCNC: 66 U/L (ref 30–99)
ALT SERPL-CCNC: 25 U/L (ref 2–50)
ANION GAP SERPL CALC-SCNC: 11 MMOL/L (ref 7–16)
AST SERPL-CCNC: 22 U/L (ref 12–45)
BILIRUB SERPL-MCNC: 0.5 MG/DL (ref 0.1–1.5)
BUN SERPL-MCNC: 12 MG/DL (ref 8–22)
CALCIUM ALBUM COR SERPL-MCNC: 9 MG/DL (ref 8.5–10.5)
CALCIUM SERPL-MCNC: 9.1 MG/DL (ref 8.5–10.5)
CHLORIDE SERPL-SCNC: 103 MMOL/L (ref 96–112)
CHOLEST SERPL-MCNC: 240 MG/DL (ref 100–199)
CO2 SERPL-SCNC: 23 MMOL/L (ref 20–33)
CREAT SERPL-MCNC: 0.98 MG/DL (ref 0.5–1.4)
EST. AVERAGE GLUCOSE BLD GHB EST-MCNC: 108 MG/DL
ESTRADIOL SERPL-MCNC: 11 PG/ML
FSH SERPL-ACNC: 130 MIU/ML
GFR SERPLBLD CREATININE-BSD FMLA CKD-EPI: 73 ML/MIN/1.73 M 2
GLOBULIN SER CALC-MCNC: 2.6 G/DL (ref 1.9–3.5)
GLUCOSE SERPL-MCNC: 94 MG/DL (ref 65–99)
HBA1C MFR BLD: 5.4 % (ref 4–5.6)
HDLC SERPL-MCNC: 60 MG/DL
LDLC SERPL CALC-MCNC: 163 MG/DL
POTASSIUM SERPL-SCNC: 4.3 MMOL/L (ref 3.6–5.5)
PROGEST SERPL-MCNC: 0.2 NG/ML
PROT SERPL-MCNC: 6.7 G/DL (ref 6–8.2)
SODIUM SERPL-SCNC: 137 MMOL/L (ref 135–145)
T4 FREE SERPL-MCNC: 1 NG/DL (ref 0.93–1.7)
TRIGL SERPL-MCNC: 86 MG/DL (ref 0–149)
TSH SERPL-ACNC: 0.91 UIU/ML (ref 0.38–5.33)

## 2025-08-06 PROCEDURE — 82670 ASSAY OF TOTAL ESTRADIOL: CPT

## 2025-08-06 PROCEDURE — 84443 ASSAY THYROID STIM HORMONE: CPT

## 2025-08-06 PROCEDURE — 36415 COLL VENOUS BLD VENIPUNCTURE: CPT

## 2025-08-06 PROCEDURE — 82306 VITAMIN D 25 HYDROXY: CPT

## 2025-08-06 PROCEDURE — 80053 COMPREHEN METABOLIC PANEL: CPT

## 2025-08-06 PROCEDURE — 83001 ASSAY OF GONADOTROPIN (FSH): CPT

## 2025-08-06 PROCEDURE — 83036 HEMOGLOBIN GLYCOSYLATED A1C: CPT

## 2025-08-06 PROCEDURE — 84144 ASSAY OF PROGESTERONE: CPT

## 2025-08-06 PROCEDURE — 84439 ASSAY OF FREE THYROXINE: CPT

## 2025-08-06 PROCEDURE — 80061 LIPID PANEL: CPT

## 2025-09-17 ENCOUNTER — APPOINTMENT (OUTPATIENT)
Facility: MEDICAL CENTER | Age: 45
End: 2025-09-17
Attending: FAMILY MEDICINE
Payer: COMMERCIAL